# Patient Record
Sex: FEMALE | Race: WHITE | NOT HISPANIC OR LATINO | Employment: UNEMPLOYED | URBAN - METROPOLITAN AREA
[De-identification: names, ages, dates, MRNs, and addresses within clinical notes are randomized per-mention and may not be internally consistent; named-entity substitution may affect disease eponyms.]

---

## 2017-01-04 ENCOUNTER — ROUTINE PRENATAL (OUTPATIENT)
Dept: OBSTETRICS AND GYNECOLOGY | Facility: CLINIC | Age: 32
End: 2017-01-04

## 2017-01-04 ENCOUNTER — PROCEDURE VISIT (OUTPATIENT)
Dept: OBSTETRICS AND GYNECOLOGY | Facility: CLINIC | Age: 32
End: 2017-01-04

## 2017-01-04 VITALS — DIASTOLIC BLOOD PRESSURE: 84 MMHG | SYSTOLIC BLOOD PRESSURE: 110 MMHG | WEIGHT: 167 LBS | BODY MASS INDEX: 32.61 KG/M2

## 2017-01-04 DIAGNOSIS — Z34.83 PRENATAL CARE, SUBSEQUENT PREGNANCY, THIRD TRIMESTER: Primary | ICD-10-CM

## 2017-01-04 PROBLEM — Z30.2 REQUEST FOR STERILIZATION: Status: ACTIVE | Noted: 2017-01-04

## 2017-01-04 LAB
GLUCOSE UR STRIP-MCNC: NEGATIVE MG/DL
KETONES UR QL: NEGATIVE
PROT UR STRIP-MCNC: ABNORMAL MG/DL

## 2017-01-04 PROCEDURE — 76819 FETAL BIOPHYS PROFIL W/O NST: CPT | Performed by: OBSTETRICS & GYNECOLOGY

## 2017-01-04 PROCEDURE — 99213 OFFICE O/P EST LOW 20 MIN: CPT | Performed by: OBSTETRICS & GYNECOLOGY

## 2017-01-04 PROCEDURE — 81002 URINALYSIS NONAUTO W/O SCOPE: CPT | Performed by: OBSTETRICS & GYNECOLOGY

## 2017-01-04 NOTE — MR AVS SNAPSHOT
Yana Dunham   2017 10:00 AM   Procedure visit    Dept Phone:  [phone]   Encounter #:  [id number]    Provider:  ULTRASOUND LPFW [place]   Department:  [facility] OB GYN                Your Full Care Plan              Your Updated Medication List          This list is accurate as of: 17 11:29 AM.  Always use your most recent med list.                * prenatal (CLASSIC) vitamin 28-0.8 MG tablet tablet       * prenatal vitamins 27-1 MG tablet tablet       * Notice:  This list has 2 medication(s) that are the same as other medications prescribed for you. Read the directions carefully, and ask your doctor or other care provider to review them with you.            We Performed the Following     US Fetal Biophysical Profile Without Non Stress Testing       You Were Diagnosed With        Codes Comments    Small for gestational age (SGA)    -  Primary ICD-10-CM: P05.00  ICD-9-CM: 764.00       Instructions     None    Patient Instructions History      Upcoming Appointments     Visit Type Date Time Department    OB ULTRASOUND 2017 10:00 AM [facility] [place]    OB FOLLOWUP 2017 10:40 AM [facility] [place]    OB ULTRASOUND 2017  9:10 AM [facility] [place]    OB FOLLOWUP 2017  9:50 AM [facility] Signup     [facility] Adfaces allows you to send messages to your doctor, view your test results, renew your prescriptions, schedule appointments, and more. To sign up, go to XO Communications and click on the Sign Up Now link in the New User? box. Enter your Adfaces Activation Code exactly as it appears below along with the last four digits of your Social Security Number and your Date of Birth () to complete the sign-up process. If you do not sign up before the expiration date, you must request a new code.    Adfaces Activation Code: [email or web address]  Expires: 2017  3:07 PM    If you have questions, you can email  [email or web address] or call [phone] to talk to our MyChart staff. Remember, CounterStormhart is NOT to be used for urgent needs. For medical emergencies, dial 911.               Other Info from Your Visit           Your Appointments     Jan 11, 2017  9:10 AM EST   OB ULTRASOUND with ULTRASOUND LPFW TANIKA   [facility] OB GYN (--)    [address] [place] [address]   [phone]            Jan 11, 2017  9:50 AM EST   OB FOLLOWUP with Randell Pate MD   [facility] GYN (--)    [address] [id number] [phone]              Allergies     No Known Allergies      Vital Signs     Last Menstrual Period Smoking Status                04/28/2016 Light Tobacco Smoker          Problems and Diagnoses Noted     Small for gestational age    -  Primary

## 2017-01-04 NOTE — PROGRESS NOTES
5-1 9.4% MIYA 12 Gr 2 placenta BPP 8/8   Disc SGA.  Rec kick cts, wkly BPPs  GBS done  Desires tubal-tubal papers

## 2017-01-04 NOTE — MR AVS SNAPSHOT
Yana Dunham   2017 10:40 AM   Routine Prenatal    Dept Phone:  [phone]   Encounter #:  [id number]    Provider:  Randell Pate MD   Department:  [facility] OB GYN                Your Full Care Plan              Your Updated Medication List          This list is accurate as of: 17 11:39 AM.  Always use your most recent med list.                * prenatal (CLASSIC) vitamin 28-0.8 MG tablet tablet       * prenatal vitamins 27-1 MG tablet tablet       * Notice:  This list has 2 medication(s) that are the same as other medications prescribed for you. Read the directions carefully, and ask your doctor or other care provider to review them with you.            We Performed the Following     POC Urinalysis Dipstick     Strep Grp B JONH + Reflex       You Were Diagnosed With        Codes Comments    Prenatal care, subsequent pregnancy, third trimester    -  Primary ICD-10-CM: Z34.83  ICD-9-CM: V22.1       Instructions     None    Patient Instructions History      Upcoming Appointments     Visit Type Date Time Department    OB ULTRASOUND 2017 10:00 AM [facility] [place]    OB FOLLOWUP 2017 10:40 AM [facility] [place]    OB ULTRASOUND 2017  9:10 AM [facility] [place]    OB FOLLOWUP 2017  9:50 AM [facility] [place]      MyChart Signup     [facility] CradlePoint Technology allows you to send messages to your doctor, view your test results, renew your prescriptions, schedule appointments, and more. To sign up, go to Lawdingo and click on the Sign Up Now link in the New User? box. Enter your CradlePoint Technology Activation Code exactly as it appears below along with the last four digits of your Social Security Number and your Date of Birth () to complete the sign-up process. If you do not sign up before the expiration date, you must request a new code.    CradlePoint Technology Activation Code: [email or web address]  Expires: 2017  3:07 PM    If you have  questions, you can email [email or web address] or call [phone] to talk to our MyChart staff. Remember, PromoFarma.comhart is NOT to be used for urgent needs. For medical emergencies, dial 911.               Other Info from Your Visit           Your Appointments     Jan 11, 2017  9:10 AM EST   OB ULTRASOUND with ULTRASOUND LPFW [place]   [facility] OB GYN (--)    [address] [place] [address]   [phone]            Jan 11, 2017  9:50 AM EST   OB FOLLOWUP with Randell Pate MD   [facility] OB GYN (--)    [address] [address]   [phone]              Allergies     No Known Allergies      Reason for Visit     Routine Prenatal Visit           Vital Signs     Blood Pressure Weight Last Menstrual Period Body Mass Index Smoking Status       110/84 167 lb (75.8 kg) 04/28/2016 32.61 kg/m2 Light Tobacco Smoker       Problems and Diagnoses Noted     Request for sterilization    Small for gestational age    Prenatal care    -  Primary      Results     POC Urinalysis Dipstick      Component Value Standard Range & Units    Glucose, UA Negative Negative, 1000 mg/dL (3+) mg/dL    Ketones, UA Negative Negative    Protein, POC 2+ Negative mg/dL

## 2017-01-06 LAB — GP B STREP DNA SPEC QL NAA+PROBE: NEGATIVE

## 2017-01-11 ENCOUNTER — PROCEDURE VISIT (OUTPATIENT)
Dept: OBSTETRICS AND GYNECOLOGY | Facility: CLINIC | Age: 32
End: 2017-01-11

## 2017-01-11 ENCOUNTER — ROUTINE PRENATAL (OUTPATIENT)
Dept: OBSTETRICS AND GYNECOLOGY | Facility: CLINIC | Age: 32
End: 2017-01-11

## 2017-01-11 VITALS — BODY MASS INDEX: 33.4 KG/M2 | SYSTOLIC BLOOD PRESSURE: 120 MMHG | DIASTOLIC BLOOD PRESSURE: 80 MMHG | WEIGHT: 171 LBS

## 2017-01-11 DIAGNOSIS — Z34.83 PRENATAL CARE, SUBSEQUENT PREGNANCY, THIRD TRIMESTER: Primary | ICD-10-CM

## 2017-01-11 DIAGNOSIS — O36.5930 SMALL FOR DATES AFFECTING MANAGEMENT OF MOTHER, THIRD TRIMESTER, NOT APPLICABLE OR UNSPECIFIED FETUS: Primary | ICD-10-CM

## 2017-01-11 LAB
GLUCOSE UR STRIP-MCNC: NEGATIVE MG/DL
KETONES UR QL: NEGATIVE
PROT UR STRIP-MCNC: NEGATIVE MG/DL

## 2017-01-11 PROCEDURE — 81002 URINALYSIS NONAUTO W/O SCOPE: CPT | Performed by: OBSTETRICS & GYNECOLOGY

## 2017-01-11 PROCEDURE — 99213 OFFICE O/P EST LOW 20 MIN: CPT | Performed by: OBSTETRICS & GYNECOLOGY

## 2017-01-11 PROCEDURE — 76819 FETAL BIOPHYS PROFIL W/O NST: CPT | Performed by: OBSTETRICS & GYNECOLOGY

## 2017-01-11 PROCEDURE — 76816 OB US FOLLOW-UP PER FETUS: CPT | Performed by: OBSTETRICS & GYNECOLOGY

## 2017-01-11 NOTE — MR AVS SNAPSHOT
Yana Dunham   2017 9:50 AM   Routine Prenatal    Dept Phone:  [phone]   Encounter #:  [id number]    Provider:  Randell Pate MD   Department:  [facility]                Your Full Care Plan              Your Updated Medication List          This list is accurate as of: 17 10:01 AM.  Always use your most recent med list.                * prenatal (CLASSIC) vitamin 28-0.8 MG tablet tablet       * prenatal vitamins 27-1 MG tablet tablet       * Notice:  This list has 2 medication(s) that are the same as other medications prescribed for you. Read the directions carefully, and ask your doctor or other care provider to review them with you.            We Performed the Following     POC Urinalysis Dipstick       You Were Diagnosed With        Codes Comments    Prenatal care, subsequent pregnancy, third trimester    -  Primary ICD-10-CM: Z34.83  ICD-9-CM: V22.1       Instructions     None    Patient Instructions History      Upcoming Appointments     Visit Type Date Time Department    OB ULTRASOUND 2017  9:10 AM [facility] [place]    OB FOLLOWUP 2017  9:50 AM [facility] [place]    OB ULTRASOUND 2017  8:20 AM [facility] [place]    OB FOLLOWUP 2017  9:30 AM [facility] [place]      MyChart Signup     [facility] ReInnervate allows you to send messages to your doctor, view your test results, renew your prescriptions, schedule appointments, and more. To sign up, go to Traffline and click on the Sign Up Now link in the New User? box. Enter your ReInnervate Activation Code exactly as it appears below along with the last four digits of your Social Security Number and your Date of Birth () to complete the sign-up process. If you do not sign up before the expiration date, you must request a new code.    ReInnervate Activation Code: [email or web address]  Expires: 2017  3:07 PM    If you have questions, you can email  [email or web address] or call [phone] to talk to our MyChart staff. Remember, MyChart is NOT to be used for urgent needs. For medical emergencies, dial 911.               Other Info from Your Visit           Your Appointments     Jan 18, 2017  8:20 AM EST   OB ULTRASOUND with ULTRASOUND LPFW TANIKA   [facility] OB GYN (--)    [address] [place] [address]   [phone]            Jan 18, 2017  9:30 AM EST   OB FOLLOWUP with Randell Pate MD   [facility] G[facility] (--)    [address] [address]   [phone]              Allergies     No Known Allergies      Reason for Visit     Routine Prenatal Visit           Vital Signs     Blood Pressure Weight Last Menstrual Period Body Mass Index Smoking Status       120/80 171 lb (77.6 kg) 04/28/2016 33.4 kg/m2 Light Tobacco Smoker       Problems and Diagnoses Noted     Prenatal care    -  Primary      Results     POC Urinalysis Dipstick      Component Value Standard Range & Units    Glucose, UA Negative Negative, 1000 mg/dL (3+) mg/dL    Ketones, UA Negative Negative    Protein, POC Negative Negative mg/dL

## 2017-01-11 NOTE — MR AVS SNAPSHOT
Yana Dunham   2017 9:10 AM   Procedure visit    Dept Phone:  [phone]   Encounter #:  [id number]    Provider:  ULTRASOUND LPFW [place]   Department:  [facility] OB GYN                Your Full Care Plan              Your Updated Medication List          This list is accurate as of: 17  9:59 AM.  Always use your most recent med list.                * prenatal (CLASSIC) vitamin 28-0.8 MG tablet tablet       * prenatal vitamins 27-1 MG tablet tablet       * Notice:  This list has 2 medication(s) that are the same as other medications prescribed for you. Read the directions carefully, and ask your doctor or other care provider to review them with you.            We Performed the Following     US Fetal Biophysical Profile Without Non Stress Testing       You Were Diagnosed With        Codes Comments    Small for dates affecting management of mother, third trimester, not applicable or unspecified fetus    -  Primary ICD-10-CM: O36.5930  ICD-9-CM: 656.53       Instructions     None    Patient Instructions History      Upcoming Appointments     Visit Type Date Time Department    OB ULTRASOUND 2017  9:10 AM [facility] [place]    OB FOLLOWUP 2017  9:50 AM [facility] [place]    OB ULTRASOUND 2017  8:20 AM [facility] [place]    OB FOLLOWUP 2017  9:30 AM [facility] [place]      MyChart Signup     [facility]vanB[facility] HospitalOcar[facility] [employer] allows you to send messages to your doctor, view your test results, renew your prescriptions, schedule appointments, and more. To sign up, go to Grouply and click on the Sign Up Now link in the New User? box. Enter your [employer] Activation Code exactly as it appears below along with the last four digits of your Social Security Number and your Date of Birth () to complete the sign-up process. If you do not sign up before the expiration date, you must request a new code.    Ot[employer] Activation Code:  [email or web address]  Expires: 1/12/2017  3:07 PM    If you have questions, you can email [email or web address] or call [phone] to talk to our MyChart staff. Remember, MyChart is NOT to be used for urgent needs. For medical emergencies, dial 911.               Other Info from Your Visit           Your Appointments     Jan 18, 2017  8:20 AM EST   OB ULTRASOUND with ULTRASOUND LPFW [facility] (--)    [address] [place] [address]   [phone]            Jan 18, 2017  9:30 AM EST   OB FOLLOWUP with Randell Pate MD   [facility] GYN (--)    [address] [place], [place] [address] [phone]              Allergies     No Known Allergies      Vital Signs     Last Menstrual Period Smoking Status                04/28/2016 Light Tobacco Smoker          Problems and Diagnoses Noted     Intrauterine growth restriction affecting care of mother    -  Primary

## 2017-01-18 ENCOUNTER — PROCEDURE VISIT (OUTPATIENT)
Dept: OBSTETRICS AND GYNECOLOGY | Facility: CLINIC | Age: 32
End: 2017-01-18

## 2017-01-18 ENCOUNTER — ROUTINE PRENATAL (OUTPATIENT)
Dept: OBSTETRICS AND GYNECOLOGY | Facility: CLINIC | Age: 32
End: 2017-01-18

## 2017-01-18 VITALS — SYSTOLIC BLOOD PRESSURE: 118 MMHG | WEIGHT: 172 LBS | DIASTOLIC BLOOD PRESSURE: 82 MMHG | BODY MASS INDEX: 33.59 KG/M2

## 2017-01-18 DIAGNOSIS — Z34.83 PRENATAL CARE, SUBSEQUENT PREGNANCY, THIRD TRIMESTER: Primary | ICD-10-CM

## 2017-01-18 LAB
GLUCOSE UR STRIP-MCNC: NEGATIVE MG/DL
KETONES UR QL: NEGATIVE
PROT UR STRIP-MCNC: ABNORMAL MG/DL
RBC # UR STRIP: ABNORMAL /UL

## 2017-01-18 PROCEDURE — 99213 OFFICE O/P EST LOW 20 MIN: CPT | Performed by: OBSTETRICS & GYNECOLOGY

## 2017-01-18 PROCEDURE — 76819 FETAL BIOPHYS PROFIL W/O NST: CPT | Performed by: OBSTETRICS & GYNECOLOGY

## 2017-01-18 PROCEDURE — 81002 URINALYSIS NONAUTO W/O SCOPE: CPT | Performed by: OBSTETRICS & GYNECOLOGY

## 2017-01-18 NOTE — MR AVS SNAPSHOT
Yana Dunham   2017 8:20 AM   Procedure visit    Dept Phone:  [phone]   Encounter #:  [id number]    Provider:  ULTRASOUND LPFW [place]   Department:  [facility] OB GYN                Your Full Care Plan              Your Updated Medication List          This list is accurate as of: 17 10:01 AM.  Always use your most recent med list.                * prenatal (CLASSIC) vitamin 28-0.8 MG tablet tablet       * prenatal vitamins 27-1 MG tablet tablet       * Notice:  This list has 2 medication(s) that are the same as other medications prescribed for you. Read the directions carefully, and ask your doctor or other care provider to review them with you.            We Performed the Following     US Fetal Biophysical Profile Without Non Stress Testing       You Were Diagnosed With        Codes Comments    Small for gestational age (SGA)    -  Primary ICD-10-CM: P05.00  ICD-9-CM: 764.00       Instructions     None    Patient Instructions History      Upcoming Appointments     Visit Type Date Time Department    OB ULTRASOUND 2017  8:20 AM [facility] [place]    OB FOLLOWUP 2017  9:30 AM [facility] [place] [facility] Signup     [facility] Boxxet allows you to send messages to your doctor, view your test results, renew your prescriptions, schedule appointments, and more. To sign up, go to Pyreg and click on the Sign Up Now link in the New User? box. Enter your Boxxet Activation Code exactly as it appears below along with the last four digits of your Social Security Number and your Date of Birth () to complete the sign-up process. If you do not sign up before the expiration date, you must request a new code.    Boxxet Activation Code: [email or web address]  Expires: 2017 10:01 AM    If you have questions, you can email [email or web address] or call [phone] to talk to our Boxxet staff. Remember, Boxxet is NOT to be  used for urgent needs. For medical emergencies, dial 911.               Other Info from Your Visit           Allergies     No Known Allergies      Vital Signs     Last Menstrual Period Smoking Status                04/28/2016 Light Tobacco Smoker          Problems and Diagnoses Noted     Small for gestational age    -  Primary

## 2017-01-18 NOTE — MR AVS SNAPSHOT
Yana Dunham   2017 9:30 AM   Routine Prenatal    Dept Phone:  [phone]   Encounter #:  [id number]    Provider:  Randell Pate MD   Department:  [facility]                Your Full Care Plan              Your Updated Medication List          This list is accurate as of: 17 10:11 AM.  Always use your most recent med list.                * prenatal (CLASSIC) vitamin 28-0.8 MG tablet tablet       * prenatal vitamins 27-1 MG tablet tablet       * Notice:  This list has 2 medication(s) that are the same as other medications prescribed for you. Read the directions carefully, and ask your doctor or other care provider to review them with you.            We Performed the Following     POC Urinalysis Dipstick       You Were Diagnosed With        Codes Comments    Prenatal care, subsequent pregnancy, third trimester    -  Primary ICD-10-CM: Z34.83  ICD-9-CM: V22.1       Instructions     None    Patient Instructions History      Upcoming Appointments     Visit Type Date Time Department    OB ULTRASOUND 2017  8:20 AM [facility] [place]    OB FOLLOWUP 2017  9:30 AM [facility] [place]    OB ULTRASOUND 2017 10:00 AM [facility] [place]    OB FOLLOWUP 2017 10:20 AM [facility] [place]      MyChart Signup     [facility] FashionStake allows you to send messages to your doctor, view your test results, renew your prescriptions, schedule appointments, and more. To sign up, go to Akumina and click on the Sign Up Now link in the New User? box. Enter your FashionStake Activation Code exactly as it appears below along with the last four digits of your Social Security Number and your Date of Birth () to complete the sign-up process. If you do not sign up before the expiration date, you must request a new code.    FashionStake Activation Code: [email or web address]  Expires: 2017 10:01 AM    If you have questions, you can email  [email or web address] or call [phone] to talk to our MyChart staff. Remember, CopperKeyhart is NOT to be used for urgent needs. For medical emergencies, dial 911.               Other Info from Your Visit           Your Appointments     Jan 25, 2017 10:00 AM EST   OB ULTRASOUND with ULTRASOUND LPFW TANIKA   [facility] OB GYN (--)    [address] [place] [address]   [phone]            Jan 25, 2017 10:20 AM EST   OB FOLLOWUP with Servando Rodriguez MD   [facility] (--)    [address] [place] [address]   [phone]              Allergies     No Known Allergies      Reason for Visit     Routine Prenatal Visit           Vital Signs     Blood Pressure Weight Last Menstrual Period Body Mass Index Smoking Status       118/82 172 lb (78 kg) 04/28/2016 33.59 kg/m2 Light Tobacco Smoker       Problems and Diagnoses Noted     Prenatal care    -  Primary      Results     POC Urinalysis Dipstick      Component Value Standard Range & Units    Glucose, UA Negative Negative, 1000 mg/dL (3+) mg/dL    Ketones, UA Negative Negative    Blood, UA Trace Negative    Protein,  mg/dL Negative mg/dL

## 2017-01-18 NOTE — PROGRESS NOTES
BPP 8/8 MIYA 10.3  No sx PIH except swelling Disc poss IOL 1-31 if still undelivered  PIH warnings

## 2017-02-13 ENCOUNTER — HOSPITAL ENCOUNTER (OUTPATIENT)
Facility: HOSPITAL | Age: 32
Setting detail: HOSPITAL OUTPATIENT SURGERY
End: 2017-02-13
Attending: OBSTETRICS & GYNECOLOGY | Admitting: OBSTETRICS & GYNECOLOGY

## 2017-02-13 ENCOUNTER — POSTPARTUM VISIT (OUTPATIENT)
Dept: OBSTETRICS AND GYNECOLOGY | Facility: CLINIC | Age: 32
End: 2017-02-13

## 2017-02-13 VITALS
HEIGHT: 62 IN | WEIGHT: 147 LBS | DIASTOLIC BLOOD PRESSURE: 74 MMHG | SYSTOLIC BLOOD PRESSURE: 100 MMHG | BODY MASS INDEX: 27.05 KG/M2

## 2017-02-13 DIAGNOSIS — Z30.2 REQUEST FOR STERILIZATION: ICD-10-CM

## 2017-02-13 PROCEDURE — 99213 OFFICE O/P EST LOW 20 MIN: CPT | Performed by: OBSTETRICS & GYNECOLOGY

## 2017-02-13 NOTE — PROGRESS NOTES
Subjective   Yana Dunham is a 31 y.o. female here for postpartum check.     History of Present Illness   Patient presents for follow-up 3 weeks after a spontaneous vaginal delivery of a viable infant 5 lbs. 15 oz. at [facility].  She desires permanent sterilization.  She is breast-feeding and this is going well.    The following portions of the patient's history were reviewed and updated as appropriate: allergies, current medications, past family history, past medical history, past social history, past surgical history and problem list.    Review of Systems   Gastrointestinal: Negative for abdominal distention and abdominal pain.   Genitourinary: Negative for difficulty urinating, pelvic pain and vaginal bleeding.       Objective   Physical Exam   Constitutional: She is oriented to person, place, and time. She appears well-developed and well-nourished.   HENT:   Head: Normocephalic.   Neck: Neck supple. No thyromegaly present.   Cardiovascular: Normal rate, regular rhythm and normal heart sounds.  Exam reveals no gallop.    No murmur heard.  Pulmonary/Chest: Effort normal and breath sounds normal.   Abdominal: Soft. Bowel sounds are normal. She exhibits no distension. There is no tenderness.   Musculoskeletal: Normal range of motion. She exhibits no edema or tenderness.   Neurological: She is alert and oriented to person, place, and time. She displays normal reflexes.   Skin: Skin is warm and dry.   Psychiatric: She has a normal mood and affect. Her behavior is normal.       Assessment/Plan   Yana was seen today for postpartum follow-up.    Diagnoses and all orders for this visit:    Postpartum state    Request for sterilization  -     Case Request   we discussed laparoscopic tubal coagulation in detail.  The risks, complications, and failure rates were discussed specifically.  Convalescence was likewise discussed.  She has already signed a tubal consent form here in the office.  We will schedule the  procedure at the patient's convenience.

## 2020-01-13 ENCOUNTER — PROCEDURE VISIT (OUTPATIENT)
Dept: OBSTETRICS AND GYNECOLOGY | Facility: CLINIC | Age: 35
End: 2020-01-13

## 2020-01-13 ENCOUNTER — INITIAL PRENATAL (OUTPATIENT)
Dept: OBSTETRICS AND GYNECOLOGY | Facility: CLINIC | Age: 35
End: 2020-01-13

## 2020-01-13 VITALS — DIASTOLIC BLOOD PRESSURE: 73 MMHG | SYSTOLIC BLOOD PRESSURE: 117 MMHG | WEIGHT: 168.8 LBS | BODY MASS INDEX: 30.87 KG/M2

## 2020-01-13 DIAGNOSIS — O09.522 MULTIGRAVIDA OF ADVANCED MATERNAL AGE IN SECOND TRIMESTER: ICD-10-CM

## 2020-01-13 DIAGNOSIS — O36.80X0 ENCOUNTER TO DETERMINE FETAL VIABILITY OF PREGNANCY, SINGLE OR UNSPECIFIED FETUS: Primary | ICD-10-CM

## 2020-01-13 DIAGNOSIS — Z34.82 PRENATAL CARE, SUBSEQUENT PREGNANCY IN SECOND TRIMESTER: Primary | ICD-10-CM

## 2020-01-13 LAB
GLUCOSE UR STRIP-MCNC: NEGATIVE MG/DL
PROT UR STRIP-MCNC: NEGATIVE MG/DL

## 2020-01-13 PROCEDURE — 76805 OB US >/= 14 WKS SNGL FETUS: CPT | Performed by: OBSTETRICS & GYNECOLOGY

## 2020-01-13 PROCEDURE — 99214 OFFICE O/P EST MOD 30 MIN: CPT | Performed by: OBSTETRICS & GYNECOLOGY

## 2020-01-13 NOTE — PROGRESS NOTES
IOB   34-year-old white female  7 para 4 AB 2 last menstrual period  presents for initial OB evaluation.  Her menstrual cycles were regular and she was not using any form of contraception.  She has no chronic medical problems and has started prenatal vitamins.  There is no family history of genetic disorders.  She has had 4 uncomplicated vaginal deliveries in the past.  Her last baby weighed 8 pounds 15 ounces.  She denies any vaginal bleeding since her last menstrual period.  ROS: + Nausea, lower extremity swelling, and reflux.  All other systems were reviewed and were negative.  S=D=U/S  A: Viable IUP at 17-1/2 weeks gestation  Advanced maternal age  Late presentation  P: We will obtain basic prenatal laboratory studies.  The patient desires genetic testing.  We discussed pregnancy in detail including diet, exercise, medications, immunizations, travel, and optional testing.

## 2020-01-14 LAB
ABO GROUP BLD: ABNORMAL
BASOPHILS # BLD AUTO: 0 X10E3/UL (ref 0–0.2)
BASOPHILS NFR BLD AUTO: 0 %
BLD GP AB SCN SERPL QL: NEGATIVE
EOSINOPHIL # BLD AUTO: 0.3 X10E3/UL (ref 0–0.4)
EOSINOPHIL NFR BLD AUTO: 3 %
ERYTHROCYTE [DISTWIDTH] IN BLOOD BY AUTOMATED COUNT: 14 % (ref 11.7–15.4)
HBV SURFACE AG SERPL QL IA: NEGATIVE
HCT VFR BLD AUTO: 31.9 % (ref 34–46.6)
HCV AB S/CO SERPL IA: <0.1 S/CO RATIO (ref 0–0.9)
HGB BLD-MCNC: 11 G/DL (ref 11.1–15.9)
HIV 1+2 AB+HIV1 P24 AG SERPL QL IA: NON REACTIVE
IMM GRANULOCYTES # BLD AUTO: 0.1 X10E3/UL (ref 0–0.1)
IMM GRANULOCYTES NFR BLD AUTO: 1 %
LYMPHOCYTES # BLD AUTO: 2.6 X10E3/UL (ref 0.7–3.1)
LYMPHOCYTES NFR BLD AUTO: 30 %
MCH RBC QN AUTO: 30.4 PG (ref 26.6–33)
MCHC RBC AUTO-ENTMCNC: 34.5 G/DL (ref 31.5–35.7)
MCV RBC AUTO: 88 FL (ref 79–97)
MONOCYTES # BLD AUTO: 0.6 X10E3/UL (ref 0.1–0.9)
MONOCYTES NFR BLD AUTO: 7 %
NEUTROPHILS # BLD AUTO: 5.3 X10E3/UL (ref 1.4–7)
NEUTROPHILS NFR BLD AUTO: 59 %
PLATELET # BLD AUTO: 368 X10E3/UL (ref 150–450)
RBC # BLD AUTO: 3.62 X10E6/UL (ref 3.77–5.28)
RH BLD: POSITIVE
RPR SER QL: NON REACTIVE
RUBV IGG SERPL IA-ACNC: <0.9 INDEX
TSH SERPL DL<=0.005 MIU/L-ACNC: 2.1 UIU/ML (ref 0.45–4.5)
WBC # BLD AUTO: 8.9 X10E3/UL (ref 3.4–10.8)

## 2020-01-15 PROBLEM — O09.899 RUBELLA NON-IMMUNE STATUS, ANTEPARTUM: Status: ACTIVE | Noted: 2020-01-15

## 2020-01-15 PROBLEM — Z28.39 RUBELLA NON-IMMUNE STATUS, ANTEPARTUM: Status: ACTIVE | Noted: 2020-01-15

## 2020-01-15 LAB
BACTERIA UR CULT: NORMAL
BACTERIA UR CULT: NORMAL
C TRACH RRNA CVX QL NAA+PROBE: NEGATIVE
CONV .: NORMAL
CYTOLOGIST CVX/VAG CYTO: NORMAL
CYTOLOGY CVX/VAG DOC CYTO: NORMAL
CYTOLOGY CVX/VAG DOC THIN PREP: NORMAL
DX ICD CODE: NORMAL
HIV 1 & 2 AB SER-IMP: NORMAL
N GONORRHOEA RRNA CVX QL NAA+PROBE: NEGATIVE
OTHER STN SPEC: NORMAL
STAT OF ADQ CVX/VAG CYTO-IMP: NORMAL

## 2020-01-18 LAB
CFDNA.FET/CFDNA.TOTAL SFR FETUS: NORMAL %
CFDNA.FET/CFDNA.TOTAL SFR FETUS: NORMAL %
CITATION REF LAB TEST: NORMAL
FET CHR 13 TS PLAS.CFDNA QL: NEGATIVE
FET CHR 13+18+21 TS PLAS.CFDNA QL: NORMAL
FET CHR 18 TS PLAS.CFDNA QL: NEGATIVE
FET CHR 21 TS PLAS.CFDNA QL: NEGATIVE
FET Y CHROM PLAS.CFDNA QL: DETECTED
FET Y CHROM PLAS.CFDNA: NORMAL
GA EST FROM CONCEPTION DATE: NORMAL D
GESTATIONAL AGE > 9:: YES
LAB DIRECTOR NAME PROVIDER: NORMAL
LABORATORY COMMENT REPORT: NORMAL
LIMITATIONS OF THE TEST: NORMAL
NOTE: NORMAL
POSITIVE PREDICTIVE VALUE: NORMAL
REF LAB TEST METHOD: NORMAL
SERVICE CMNT 02-IMP: NORMAL
SERVICE CMNT 03-IMP: NORMAL
SERVICE CMNT-IMP: NORMAL
TEST PERFORMANCE INFO SPEC: NORMAL
TEST PERFORMANCE INFO SPEC: NORMAL

## 2020-01-27 ENCOUNTER — TELEPHONE (OUTPATIENT)
Dept: OBSTETRICS AND GYNECOLOGY | Facility: CLINIC | Age: 35
End: 2020-01-27

## 2020-01-27 NOTE — TELEPHONE ENCOUNTER
----- Message from Felicity Tavarez MD sent at 1/20/2020 10:31 PM EST -----  Please let patient know that aneuploidy screening was negative for trisomy 21/18/13 and if she wishes to know gender, it's a boy.    Patient has been informed of negative trisomy 21/18/13 and is aware of gender being a boy

## 2020-02-10 ENCOUNTER — PROCEDURE VISIT (OUTPATIENT)
Dept: OBSTETRICS AND GYNECOLOGY | Facility: CLINIC | Age: 35
End: 2020-02-10

## 2020-02-10 DIAGNOSIS — O35.BXX0 ECHOGENIC FOCUS OF HEART OF FETUS AFFECTING ANTEPARTUM CARE OF MOTHER, SINGLE OR UNSPECIFIED FETUS: ICD-10-CM

## 2020-02-10 DIAGNOSIS — Z36.89 ENCOUNTER FOR FETAL ANATOMIC SURVEY: ICD-10-CM

## 2020-02-10 DIAGNOSIS — O09.522 MULTIGRAVIDA OF ADVANCED MATERNAL AGE IN SECOND TRIMESTER: Primary | ICD-10-CM

## 2020-02-10 PROCEDURE — 76805 OB US >/= 14 WKS SNGL FETUS: CPT | Performed by: OBSTETRICS & GYNECOLOGY

## 2020-02-19 ENCOUNTER — TELEPHONE (OUTPATIENT)
Dept: OBSTETRICS AND GYNECOLOGY | Facility: CLINIC | Age: 35
End: 2020-02-19

## 2020-02-24 ENCOUNTER — TELEPHONE (OUTPATIENT)
Dept: OBSTETRICS AND GYNECOLOGY | Facility: CLINIC | Age: 35
End: 2020-02-24

## 2020-03-27 ENCOUNTER — TELEPHONE (OUTPATIENT)
Dept: OBSTETRICS AND GYNECOLOGY | Facility: CLINIC | Age: 35
End: 2020-03-27

## 2020-04-15 ENCOUNTER — TELEPHONE (OUTPATIENT)
Dept: OBSTETRICS AND GYNECOLOGY | Facility: CLINIC | Age: 35
End: 2020-04-15

## 2020-04-15 NOTE — TELEPHONE ENCOUNTER
Good Afternoon,    Patient called to schedule her appointment and wanted to know if you wanted her to do her glucose test at this visit or not. Please advise.      # - [phone]  Thank you

## 2020-04-20 ENCOUNTER — HOSPITAL ENCOUNTER (EMERGENCY)
Facility: HOSPITAL | Age: 35
Discharge: HOME | End: 2020-04-20
Attending: OBSTETRICS & GYNECOLOGY | Admitting: OBSTETRICS & GYNECOLOGY

## 2020-04-20 VITALS
HEART RATE: 71 BPM | RESPIRATION RATE: 18 BRPM | OXYGEN SATURATION: 97 % | BODY MASS INDEX: 32.07 KG/M2 | TEMPERATURE: 98.1 F | HEIGHT: 63 IN | WEIGHT: 181 LBS | SYSTOLIC BLOOD PRESSURE: 116 MMHG | DIASTOLIC BLOOD PRESSURE: 75 MMHG

## 2020-04-20 LAB
AMPHET+METHAMPHET UR QL: POSITIVE
BARBITURATES UR QL SCN: NEGATIVE
BENZODIAZ UR QL SCN: NEGATIVE
BILIRUB UR QL STRIP: NEGATIVE
CANNABINOIDS SERPL QL: NEGATIVE
CLARITY UR: CLEAR
COCAINE UR QL: NEGATIVE
COLOR UR: YELLOW
EXPIRATION DATE: NORMAL
GLUCOSE UR STRIP-MCNC: NEGATIVE MG/DL
HGB UR QL STRIP.AUTO: NEGATIVE
KETONES UR QL STRIP: NEGATIVE
LEUKOCYTE ESTERASE UR QL STRIP.AUTO: NEGATIVE
Lab: NORMAL
METHADONE UR QL SCN: NEGATIVE
NITRITE UR QL STRIP: NEGATIVE
OPIATES UR QL: NEGATIVE
OXYCODONE UR QL SCN: NEGATIVE
PH UR STRIP.AUTO: <=5 [PH] (ref 5–8)
PROT UR QL STRIP: NEGATIVE
PROT UR STRIP-MCNC: NEGATIVE MG/DL
SP GR UR STRIP: 1.02 (ref 1–1.03)
UROBILINOGEN UR QL STRIP: NORMAL

## 2020-04-20 PROCEDURE — 59025 FETAL NON-STRESS TEST: CPT

## 2020-04-20 PROCEDURE — 80307 DRUG TEST PRSMV CHEM ANLYZR: CPT | Performed by: OBSTETRICS & GYNECOLOGY

## 2020-04-20 PROCEDURE — 81002 URINALYSIS NONAUTO W/O SCOPE: CPT | Performed by: OBSTETRICS & GYNECOLOGY

## 2020-04-20 PROCEDURE — 81003 URINALYSIS AUTO W/O SCOPE: CPT | Performed by: OBSTETRICS & GYNECOLOGY

## 2020-04-20 PROCEDURE — 99283 EMERGENCY DEPT VISIT LOW MDM: CPT

## 2020-04-20 RX ORDER — PANTOPRAZOLE SODIUM 40 MG/1
80 TABLET, DELAYED RELEASE ORAL ONCE
Status: COMPLETED | OUTPATIENT
Start: 2020-04-20 | End: 2020-04-20

## 2020-04-20 RX ORDER — CALCIUM CARBONATE 200(500)MG
2 TABLET,CHEWABLE ORAL DAILY
COMMUNITY
End: 2020-06-05 | Stop reason: HOSPADM

## 2020-04-20 RX ADMIN — PANTOPRAZOLE SODIUM 80 MG: 40 TABLET, DELAYED RELEASE ORAL at 22:28

## 2020-04-21 NOTE — ED PROVIDER NOTES
"[facility]  Yana Dunham  : 1985  MRN: [id number]  CSN: [id number]    OB ED Provider Note    Subjective   Chief Complaint   Patient presents with   • Abdominal Pain     Heartburn worse x3 days, vomiting \"pure acid\", tums is not helping     Yana Dunham is a 34 y.o. year old  with an Estimated Date of Delivery: 20 currently at 31w3d presenting with 4 day history of worsening heartburn and reflux, awakening her from sleep with reflux to her larynx.  She has had minimal relief from Tums.  She denies CTX, ROM or VB.  FM is present.  She is also complaining of pelvic pain, worse with ambulation, with bilateral LBP.    Prenatal care has been with Dr. Pate.  It has been complicated by elderly multigravida, non-compliance..    OB History    Para Term  AB Living   7 5 4 1 1 5   SAB TAB Ectopic Molar Multiple Live Births   1 0 0 0 0 0      # Outcome Date GA Lbr Vin/2nd Weight Sex Delivery Anes PTL Lv   7 Current            6 SAB            5 Term            4 Term            3 Term            2 Term            1               Past Medical History:   Diagnosis Date   • Abnormal Pap smear of cervix      Past Surgical History:   Procedure Laterality Date   • ADENOIDECTOMY     • CERVICAL BIOPSY  W/ LOOP ELECTRODE EXCISION     • CRYOTHERAPY     • TONSILLECTOMY         Current Facility-Administered Medications:   •  pantoprazole (PROTONIX) EC tablet 80 mg, 80 mg, Oral, Once, Chirag Santana MD    No Known Allergies  Social History    Tobacco Use      Smoking status: Light Tobacco Smoker        Packs/day: 0.25        Types: Cigarettes      Smokeless tobacco: Never Used    Review of Systems   Constitutional: Negative.    HENT: Positive for sore throat.    Eyes: Negative.    Respiratory: Negative.    Cardiovascular: Negative.    Gastrointestinal: Positive for abdominal pain and nausea.   Endocrine: Negative.    Genitourinary: Negative.    Musculoskeletal: Positive for back pain and " "gait problem.   Skin: Negative.          Objective   Pulse (!) 122   Temp 98.1 °F (36.7 °C) (Oral)   Resp 18   Ht 160 cm (63\")   Wt 82.1 kg (181 lb)   LMP 09/13/2019 (Exact Date)   BMI 32.06 kg/m²   General: well developed; well nourished  no acute distress   Abdomen: gravid, no epigastric tenderness, 2/4 symphyseal tenderness   FHT's: reassuring and category 1      Cervix: was not checked.   Presentation: cephalic   Contractions: none   Chest: Unlabored respirations   CV:  RRR   Ext:   No C/C/E   Back: CVA tenderness is absent, bilateral SI joint pain bilateral        Prenatal Labs  Lab Results   Component Value Date    HGB 11.0 (L) 01/13/2020    RUBELLAABIGG <0.90 (L) 01/13/2020    HEPBSAG Negative 01/13/2020    ABSCRN Negative 01/13/2020    EWZ7RUV3 Non Reactive 01/13/2020    HEPCVIRUSABY <0.1 01/13/2020     11/07/2016    STREPGPB Negative 01/04/2017    URINECX Final report 01/13/2020       Current Labs Reviewed   UA:    Lab Results   Component Value Date    SPECGRAVUR 1.018 04/20/2020    KETONESU Negative 04/20/2020    BLOODU Negative 04/20/2020    LEUKOCYTESUR Negative 04/20/2020    NITRITEU Negative 04/20/2020     UDS + for amphetamine     Assessment   1. IUP at 31w3d  2. GERD  3. Pelvic pain     Plan   1. Continue Tums.  Add OTC prilosec.  Encouraged elevation of head when sleeping.  Non-compliance with appointments addressed.  She was strongly encouraged to resume scheduled prenatal care.  Return for worsening symptoms, PTL.    Chirag Santana MD  4/20/2020  22:18     "

## 2020-05-04 ENCOUNTER — ROUTINE PRENATAL (OUTPATIENT)
Dept: OBSTETRICS AND GYNECOLOGY | Facility: CLINIC | Age: 35
End: 2020-05-04

## 2020-05-04 VITALS — SYSTOLIC BLOOD PRESSURE: 108 MMHG | WEIGHT: 186 LBS | BODY MASS INDEX: 32.95 KG/M2 | DIASTOLIC BLOOD PRESSURE: 71 MMHG

## 2020-05-04 DIAGNOSIS — O99.613 GASTROESOPHAGEAL REFLUX DURING PREGNANCY IN THIRD TRIMESTER, ANTEPARTUM: ICD-10-CM

## 2020-05-04 DIAGNOSIS — O09.899 RUBELLA NON-IMMUNE STATUS, ANTEPARTUM: ICD-10-CM

## 2020-05-04 DIAGNOSIS — Z34.83 PRENATAL CARE, SUBSEQUENT PREGNANCY, THIRD TRIMESTER: ICD-10-CM

## 2020-05-04 DIAGNOSIS — K21.9 GASTROESOPHAGEAL REFLUX DURING PREGNANCY IN THIRD TRIMESTER, ANTEPARTUM: ICD-10-CM

## 2020-05-04 DIAGNOSIS — Z28.39 RUBELLA NON-IMMUNE STATUS, ANTEPARTUM: ICD-10-CM

## 2020-05-04 DIAGNOSIS — Z30.2 REQUEST FOR STERILIZATION: Primary | ICD-10-CM

## 2020-05-04 DIAGNOSIS — O09.33 INSUFFICIENT PRENATAL CARE IN THIRD TRIMESTER: ICD-10-CM

## 2020-05-04 LAB
GLUCOSE UR STRIP-MCNC: NEGATIVE MG/DL
PROT UR STRIP-MCNC: NEGATIVE MG/DL

## 2020-05-04 PROCEDURE — 99214 OFFICE O/P EST MOD 30 MIN: CPT | Performed by: OBSTETRICS & GYNECOLOGY

## 2020-05-04 RX ORDER — FAMOTIDINE 20 MG/1
20 TABLET, FILM COATED ORAL 2 TIMES DAILY
Qty: 60 TABLET | Refills: 1 | Status: SHIPPED | OUTPATIENT
Start: 2020-05-04 | End: 2020-06-05 | Stop reason: HOSPADM

## 2020-05-04 NOTE — PROGRESS NOTES
CC: 33w3d IUP  Fetus active  No ctx  ROS: + reflux, breast fullness without leakage  A: Reflux  Non-compliance  P: Rx Pepcid  Needs GTS  U/S 2 weeks  Disc compliance

## 2020-05-05 ENCOUNTER — TELEPHONE (OUTPATIENT)
Dept: OBSTETRICS AND GYNECOLOGY | Facility: CLINIC | Age: 35
End: 2020-05-05

## 2020-05-06 ENCOUNTER — TELEPHONE (OUTPATIENT)
Dept: OBSTETRICS AND GYNECOLOGY | Facility: CLINIC | Age: 35
End: 2020-05-06

## 2020-05-06 NOTE — TELEPHONE ENCOUNTER
Patient wants to know what she is able to take for sleep?  States has been unable to sleep the past two days.

## 2020-05-07 LAB
BASOPHILS # BLD AUTO: 0.05 10*3/MM3 (ref 0–0.2)
BASOPHILS NFR BLD AUTO: 0.4 % (ref 0–1.5)
BLD GP AB SCN SERPL QL: NEGATIVE
EOSINOPHIL # BLD AUTO: 0.18 10*3/MM3 (ref 0–0.4)
EOSINOPHIL NFR BLD AUTO: 1.4 % (ref 0.3–6.2)
ERYTHROCYTE [DISTWIDTH] IN BLOOD BY AUTOMATED COUNT: 13.5 % (ref 12.3–15.4)
GLUCOSE 1H P 50 G GLC PO SERPL-MCNC: 202 MG/DL (ref 65–179)
HCT VFR BLD AUTO: 31.7 % (ref 34–46.6)
HGB BLD-MCNC: 10.3 G/DL (ref 12–15.9)
IMM GRANULOCYTES # BLD AUTO: 0.31 10*3/MM3 (ref 0–0.05)
IMM GRANULOCYTES NFR BLD AUTO: 2.5 % (ref 0–0.5)
LYMPHOCYTES # BLD AUTO: 2.22 10*3/MM3 (ref 0.7–3.1)
LYMPHOCYTES NFR BLD AUTO: 17.8 % (ref 19.6–45.3)
MCH RBC QN AUTO: 26.8 PG (ref 26.6–33)
MCHC RBC AUTO-ENTMCNC: 32.5 G/DL (ref 31.5–35.7)
MCV RBC AUTO: 82.3 FL (ref 79–97)
MONOCYTES # BLD AUTO: 0.56 10*3/MM3 (ref 0.1–0.9)
MONOCYTES NFR BLD AUTO: 4.5 % (ref 5–12)
NEUTROPHILS # BLD AUTO: 9.18 10*3/MM3 (ref 1.7–7)
NEUTROPHILS NFR BLD AUTO: 73.4 % (ref 42.7–76)
NRBC BLD AUTO-RTO: 0.1 /100 WBC (ref 0–0.2)
PLATELET # BLD AUTO: 343 10*3/MM3 (ref 140–450)
RBC # BLD AUTO: 3.85 10*6/MM3 (ref 3.77–5.28)
WBC # BLD AUTO: 12.5 10*3/MM3 (ref 3.4–10.8)

## 2020-05-08 RX ORDER — FERROUS SULFATE 325(65) MG
325 TABLET ORAL 2 TIMES DAILY WITH MEALS
Qty: 60 TABLET | Refills: 2 | Status: SHIPPED | OUTPATIENT
Start: 2020-05-08 | End: 2020-06-05 | Stop reason: HOSPADM

## 2020-05-09 ENCOUNTER — RESULTS ENCOUNTER (OUTPATIENT)
Dept: OBSTETRICS AND GYNECOLOGY | Facility: CLINIC | Age: 35
End: 2020-05-09

## 2020-05-09 DIAGNOSIS — Z34.83 PRENATAL CARE, SUBSEQUENT PREGNANCY, THIRD TRIMESTER: ICD-10-CM

## 2020-05-12 ENCOUNTER — LAB (OUTPATIENT)
Dept: OBSTETRICS AND GYNECOLOGY | Facility: CLINIC | Age: 35
End: 2020-05-12

## 2020-05-12 ENCOUNTER — PROCEDURE VISIT (OUTPATIENT)
Dept: OBSTETRICS AND GYNECOLOGY | Facility: CLINIC | Age: 35
End: 2020-05-12

## 2020-05-12 DIAGNOSIS — Z34.92 SECOND TRIMESTER PREGNANCY: Primary | ICD-10-CM

## 2020-05-12 DIAGNOSIS — O26.849 FETAL SIZE INCONSISTENT WITH DATES: Primary | ICD-10-CM

## 2020-05-12 DIAGNOSIS — Z3A.34 34 WEEKS GESTATION OF PREGNANCY: Primary | ICD-10-CM

## 2020-05-12 PROCEDURE — 76816 OB US FOLLOW-UP PER FETUS: CPT | Performed by: OBSTETRICS & GYNECOLOGY

## 2020-05-13 ENCOUNTER — TELEPHONE (OUTPATIENT)
Dept: OBSTETRICS AND GYNECOLOGY | Facility: CLINIC | Age: 35
End: 2020-05-13

## 2020-05-13 LAB
GLUCOSE 1H P 100 G GLC PO SERPL-MCNC: 239 MG/DL (ref 65–179)
GLUCOSE 2H P 100 G GLC PO SERPL-MCNC: 156 MG/DL (ref 65–154)
GLUCOSE 3H P 100 G GLC PO SERPL-MCNC: 66 MG/DL (ref 65–139)
GLUCOSE P FAST SERPL-MCNC: 98 MG/DL (ref 65–94)

## 2020-05-17 ENCOUNTER — RESULTS ENCOUNTER (OUTPATIENT)
Dept: OBSTETRICS AND GYNECOLOGY | Facility: CLINIC | Age: 35
End: 2020-05-17

## 2020-05-17 DIAGNOSIS — Z3A.34 34 WEEKS GESTATION OF PREGNANCY: ICD-10-CM

## 2020-05-27 ENCOUNTER — ROUTINE PRENATAL (OUTPATIENT)
Dept: OBSTETRICS AND GYNECOLOGY | Facility: CLINIC | Age: 35
End: 2020-05-27

## 2020-05-27 VITALS — BODY MASS INDEX: 34.19 KG/M2 | DIASTOLIC BLOOD PRESSURE: 72 MMHG | SYSTOLIC BLOOD PRESSURE: 108 MMHG | WEIGHT: 193 LBS

## 2020-05-27 DIAGNOSIS — Z28.39 RUBELLA NON-IMMUNE STATUS, ANTEPARTUM: ICD-10-CM

## 2020-05-27 DIAGNOSIS — O09.33 INSUFFICIENT PRENATAL CARE IN THIRD TRIMESTER: ICD-10-CM

## 2020-05-27 DIAGNOSIS — O36.5939 SGA (SMALL FOR GESTATIONAL AGE), FETAL, AFFECTING CARE OF MOTHER, ANTEPARTUM, THIRD TRIMESTER, OTHER FETUS: ICD-10-CM

## 2020-05-27 DIAGNOSIS — Z34.83 PRENATAL CARE, SUBSEQUENT PREGNANCY, THIRD TRIMESTER: Primary | ICD-10-CM

## 2020-05-27 DIAGNOSIS — O09.899 RUBELLA NON-IMMUNE STATUS, ANTEPARTUM: ICD-10-CM

## 2020-05-27 LAB
GLUCOSE UR STRIP-MCNC: NEGATIVE MG/DL
PROT UR STRIP-MCNC: NEGATIVE MG/DL

## 2020-05-27 PROCEDURE — 99213 OFFICE O/P EST LOW 20 MIN: CPT | Performed by: OBSTETRICS & GYNECOLOGY

## 2020-05-27 NOTE — PROGRESS NOTES
CC: 36w5d IUP  Fetus active  B-H ctx  EFW 4-15 28% 2 weeks ago  AC 6% MIYA 15  ROS: + LE swelling  AL borderline SGA  Borderline GDM  P: Recheck growth next week  Watch carbs  GBS done

## 2020-05-29 ENCOUNTER — TELEPHONE (OUTPATIENT)
Dept: OBSTETRICS AND GYNECOLOGY | Facility: CLINIC | Age: 35
End: 2020-05-29

## 2020-05-29 LAB — GP B STREP DNA SPEC QL NAA+PROBE: NEGATIVE

## 2020-05-31 ENCOUNTER — HOSPITAL ENCOUNTER (EMERGENCY)
Facility: HOSPITAL | Age: 35
Discharge: HOME | End: 2020-05-31
Attending: OBSTETRICS & GYNECOLOGY | Admitting: OBSTETRICS & GYNECOLOGY

## 2020-05-31 ENCOUNTER — HOSPITAL ENCOUNTER (OUTPATIENT)
Dept: LABOR AND DELIVERY | Facility: HOSPITAL | Age: 35
Discharge: HOME | End: 2020-05-31
Attending: OBSTETRICS & GYNECOLOGY

## 2020-05-31 VITALS
WEIGHT: 190 LBS | SYSTOLIC BLOOD PRESSURE: 117 MMHG | DIASTOLIC BLOOD PRESSURE: 69 MMHG | BODY MASS INDEX: 37.3 KG/M2 | HEIGHT: 60 IN | RESPIRATION RATE: 18 BRPM | HEART RATE: 96 BPM | TEMPERATURE: 98.4 F

## 2020-05-31 PROCEDURE — 59025 FETAL NON-STRESS TEST: CPT

## 2020-05-31 PROCEDURE — 99284 EMERGENCY DEPT VISIT MOD MDM: CPT

## 2020-05-31 PROCEDURE — 81001 URINALYSIS AUTO W/SCOPE: CPT | Performed by: OBSTETRICS & GYNECOLOGY

## 2020-06-01 ENCOUNTER — ROUTINE PRENATAL (OUTPATIENT)
Dept: OBSTETRICS AND GYNECOLOGY | Facility: CLINIC | Age: 35
End: 2020-06-01

## 2020-06-01 ENCOUNTER — PROCEDURE VISIT (OUTPATIENT)
Dept: OBSTETRICS AND GYNECOLOGY | Facility: CLINIC | Age: 35
End: 2020-06-01

## 2020-06-01 VITALS — WEIGHT: 191 LBS | BODY MASS INDEX: 37.3 KG/M2 | DIASTOLIC BLOOD PRESSURE: 69 MMHG | SYSTOLIC BLOOD PRESSURE: 101 MMHG

## 2020-06-01 DIAGNOSIS — O09.33 INSUFFICIENT PRENATAL CARE IN THIRD TRIMESTER: ICD-10-CM

## 2020-06-01 DIAGNOSIS — O36.5939 SGA (SMALL FOR GESTATIONAL AGE), FETAL, AFFECTING CARE OF MOTHER, ANTEPARTUM, THIRD TRIMESTER, OTHER FETUS: ICD-10-CM

## 2020-06-01 DIAGNOSIS — E66.01 MORBIDLY OBESE (HCC): ICD-10-CM

## 2020-06-01 DIAGNOSIS — Z34.83 PRENATAL CARE, SUBSEQUENT PREGNANCY, THIRD TRIMESTER: Primary | ICD-10-CM

## 2020-06-01 DIAGNOSIS — O99.333 MATERNAL TOBACCO USE IN THIRD TRIMESTER: ICD-10-CM

## 2020-06-01 DIAGNOSIS — O36.5939 SGA (SMALL FOR GESTATIONAL AGE), FETAL, AFFECTING CARE OF MOTHER, ANTEPARTUM, THIRD TRIMESTER, OTHER FETUS: Primary | ICD-10-CM

## 2020-06-01 LAB
BACTERIA UR QL AUTO: ABNORMAL /HPF
BILIRUB UR QL STRIP: NEGATIVE
CLARITY UR: ABNORMAL
COLOR UR: ABNORMAL
GLUCOSE UR STRIP-MCNC: NEGATIVE MG/DL
GLUCOSE UR STRIP-MCNC: NEGATIVE MG/DL
HGB UR QL STRIP.AUTO: NEGATIVE
HYALINE CASTS UR QL AUTO: ABNORMAL /LPF
KETONES UR QL STRIP: ABNORMAL
LEUKOCYTE ESTERASE UR QL STRIP.AUTO: NEGATIVE
NITRITE UR QL STRIP: NEGATIVE
PH UR STRIP.AUTO: 5.5 [PH] (ref 5–8)
PROT UR QL STRIP: ABNORMAL
PROT UR STRIP-MCNC: ABNORMAL MG/DL
RBC # UR: ABNORMAL /HPF
REF LAB TEST METHOD: ABNORMAL
SP GR UR STRIP: >=1.03 (ref 1–1.03)
SQUAMOUS #/AREA URNS HPF: ABNORMAL /HPF
UROBILINOGEN UR QL STRIP: ABNORMAL
WBC UR QL AUTO: ABNORMAL /HPF

## 2020-06-01 PROCEDURE — 59025 FETAL NON-STRESS TEST: CPT

## 2020-06-01 PROCEDURE — 99213 OFFICE O/P EST LOW 20 MIN: CPT | Performed by: OBSTETRICS & GYNECOLOGY

## 2020-06-01 PROCEDURE — 76816 OB US FOLLOW-UP PER FETUS: CPT | Performed by: OBSTETRICS & GYNECOLOGY

## 2020-06-01 NOTE — OBED NOTES
[facility] Note [facility]        Patient Name: Yana Dunham  YOB: 1985  MRN: [id number]  Admission Date: 2020  9:20 PM  Date of Service: 2020    Chief Complaint: Contractions ([facility]- pt rpt having irregular contractions all day long and are rangine from 3-5 min currently; lower back pain and pelvic pressure and sharp pain in lower abdomen ythat started 3 days ago; +FM; denies LOF or VB. Pt was seen at [facility] last nigth for r/o labor and had no cervical change and was d/c home. Pt is 250/-2)        Subjective     Yana Dunham is a 34 y.o. female  at 37w2d with Estimated Date of Delivery: 20 who presents with the chief complaint listed above.  She sees Randell Pate MD for her prenatal care. Her pregnancy has been complicated by: Small for gestational age fetus, insufficient prenatal care in the third trimester.  Patient presenting to OB ED secondary to irregular contractions all day long, only they are every 3 to 5 minutes and then contractions started approximately 3 days ago.  She was seen at [facility] last evening where she was noted to be 2 cm 50% dilated vertex -2.  She was discharged home as she made no cervical change.  She describes fetal movement as normal.  She denies rupture of membranes.  She denies vaginal bleeding.         Objective   Patient Active Problem List    Diagnosis   • SGA (small for gestational age), fetal, affecting care of mother, antepartum, third trimester, other fetus [O36.5939]   • Prenatal care, subsequent pregnancy, third trimester [Z34.83]   • Gastroesophageal reflux during pregnancy in third trimester, antepartum [O99.613, K21.9]   • Insufficient prenatal care in third trimester [O09.33]   • Rubella non-immune status, antepartum [O99.89, Z28.3]   • Request for sterilization [Z30.2]        OB History    Para Term  AB Living   7 5 5 0 1 5   SAB TAB Ectopic Molar Multiple Live Births   1 0 0 0 0 0      # Outcome Date GA  Lbr Vin/2nd Weight Sex Delivery Anes PTL Lv   7 Current            6 SAB            5 Term  38w0d          4 Term  40w0d          3 Term  38w0d          2 Term  38w0d          1 Term  38w0d               Past Medical History:   Diagnosis Date   • Abnormal Pap smear of cervix        Past Surgical History:   Procedure Laterality Date   • ADENOIDECTOMY     • CERVICAL BIOPSY  W/ LOOP ELECTRODE EXCISION     • CRYOTHERAPY     • TONSILLECTOMY         No current facility-administered medications on file prior to encounter.      Current Outpatient Medications on File Prior to Encounter   Medication Sig Dispense Refill   • calcium carbonate (TUMS) 500 MG chewable tablet Chew 2 tablets Daily.     • famotidine (PEPCID) 20 MG tablet Take 1 tablet by mouth 2 (Two) Times a Day. 60 tablet 1   • ferrous sulfate 325 (65 FE) MG tablet Take 1 tablet by mouth 2 (Two) Times a Day With Meals. 60 tablet 2   • Prenatal Vit-Fe Fumarate-FA (PRENATAL, CLASSIC, VITAMIN) 28-0.8 MG tablet tablet Take  by mouth daily.         No Known Allergies    Family History   Problem Relation Age of Onset   • Hypertension Father    • Asthma Son    • Other Son    • Breast cancer Neg Hx    • Ovarian cancer Neg Hx    • Uterine cancer Neg Hx    • Colon cancer Neg Hx        Social History     Socioeconomic History   • Marital status: Single     Spouse name: Not on file   • Number of children: Not on file   • Years of education: Not on file   • Highest education level: Not on file   Tobacco Use   • Smoking status: Light Tobacco Smoker     Packs/day: 0.25     Types: Cigarettes   • Smokeless tobacco: Never Used   Substance and Sexual Activity   • Alcohol use: No   • Drug use: No   • Sexual activity: Yes     Partners: Male           Review of Systems   Constitutional: Negative for chills and fever.   HENT: Negative.    Eyes: Negative for photophobia and visual disturbance.   Respiratory: Negative for cough, chest tightness and shortness of breath.    Cardiovascular:  "Negative for leg swelling.   Gastrointestinal: Positive for abdominal pain. Negative for diarrhea, nausea and vomiting.   Genitourinary: Negative for dysuria, flank pain, hematuria, pelvic pain, vaginal bleeding and vaginal discharge.   Musculoskeletal: Negative for back pain.   Neurological: Negative for dizziness, weakness and headaches.        PHYSICAL EXAM:      VITAL SIGNS:  Vitals:    05/31/20 2142 05/31/20 2202 05/31/20 2232 05/31/20 2302   BP:  114/68 118/69 117/69   BP Location:       Patient Position:       Pulse:  102 101 96   Resp:       Temp:       TempSrc:       Weight: 86.2 kg (190 lb)      Height: 152.4 cm (60\")           FHT'S:                   Baseline:  135 - 140 BPM  Variability:  Moderate = 6 - 25 BPM  Accelerations:  15 x 15 accelerations present     Decelerations:  absent  Contractions:   present     Interpretation:   Reactive NST, CAT 1 tracing        PHYSICAL EXAM:    General: well developed; well nourished  no acute distress   Heart: Not performed.   Lungs  Back: breathing is unlabored  CVA tenderness is absent   Abdomen: soft, non-tender; no masses  no umbilical or inguinal hernias are present  no hepato-splenomegaly       Cervix: Initial exam  Cervical Dilation (cm): 2  Cervical Effacement: 50%  Fetal Station: -2  Cervical Consistency: soft  Cervical Position: posterior    Repeat exam  Cervical Dilation (cm): 2  Cervical Effacement: 50%  Fetal Station: -2  Cervical Consistency: soft  Cervical Position: posterior   Contractions: irregular        Extremities: peripheral pulses normal, no pedal edema, no clubbing or cyanosis      LABS AND TESTING ORDERED:  1. Uterine and fetal monitoring  2. Urinalysis  3. Observation for labor over time    LAB RESULTS:    No results found for this or any previous visit (from the past 24 hour(s)).    Lab Results   Component Value Date    ABO O 01/13/2020    RH Positive 01/13/2020       Lab Results   Component Value Date    STREPGPB Negative 05/27/2020 "                 Assessment/Plan      Yana Dunham is a 34 y.o. female  at 37w2d who presented with irregular contractions and cramping    She was observed for labor but did not change her cervix which was noted to be Cervical Dilation (cm): 2 cm/ Cervical Effacement: 50% % effaced/ vertex at Fetal Station: -2 station. She was noted to have a  Reactive NST and was watched for approximately 2 hour(s).   In view of this she was discharged to home.    Final Impression:  • Pregnancy at 37w2d  • False labor after 37 weeks gestation in the third trimester  • Reactive NST, CAT 1 tracing, Reassuring fetal status  • Maternal vital signs were normal    • She was discharged to home       · She will continue her home meds          Your medication list      CONTINUE taking these medications      Instructions Last Dose Given Next Dose Due   calcium carbonate 500 MG chewable tablet  Commonly known as:  TUMS      Chew 2 tablets Daily.       famotidine 20 MG tablet  Commonly known as:  PEPCID      Take 1 tablet by mouth 2 (Two) Times a Day.       ferrous sulfate 325 (65 FE) MG tablet      Take 1 tablet by mouth 2 (Two) Times a Day With Meals.       prenatal (CLASSIC) vitamin 28-0.8 MG tablet tablet      Take  by mouth daily.              1. She will follow up with Randell Pate MD as scheduled and as needed  2. She has been instructed to return for contractions, vaginal bleeding, Rupture of membranes, decreased fetal movement or other concern  3. She may call the office or [facility] with questions.    I have spent 30 minutes including face to face time with the patient, greater than 50% in discussion of the diagnosis (counseling) and/or coordination of care.     Pastor Marvin MD  2020  23:42  OB Hospitalist  Phone:  [phone]

## 2020-06-01 NOTE — PROGRESS NOTES
CC: 37w3d IUP  Fetus remains active  Ctx q 4-7 minutes-mild  Denies LOF or bleeding  EFW 6-2 23% MIYA 16  Smoking 3 cig/d  GBS neg  A: borderline SGA  Tobacco use  Obesity  P: Kick cts  BPP next week  Disc indications for IOL

## 2020-06-03 ENCOUNTER — HOSPITAL ENCOUNTER (INPATIENT)
Facility: HOSPITAL | Age: 35
LOS: 2 days | Discharge: HOME | End: 2020-06-05
Attending: OBSTETRICS & GYNECOLOGY | Admitting: OBSTETRICS & GYNECOLOGY

## 2020-06-03 ENCOUNTER — ANESTHESIA EVENT (OUTPATIENT)
Dept: LABOR AND DELIVERY | Facility: HOSPITAL | Age: 35
End: 2020-06-03

## 2020-06-03 ENCOUNTER — ANESTHESIA (OUTPATIENT)
Dept: LABOR AND DELIVERY | Facility: HOSPITAL | Age: 35
End: 2020-06-03

## 2020-06-03 PROBLEM — Z34.90 PREGNANCY: Status: ACTIVE | Noted: 2020-06-03

## 2020-06-03 LAB
ABO GROUP BLD: NORMAL
AMPHET+METHAMPHET UR QL: NEGATIVE
BARBITURATES UR QL SCN: NEGATIVE
BASOPHILS # BLD AUTO: 0.04 10*3/MM3 (ref 0–0.2)
BASOPHILS NFR BLD AUTO: 0.4 % (ref 0–1.5)
BENZODIAZ UR QL SCN: NEGATIVE
BLD GP AB SCN SERPL QL: NEGATIVE
CANNABINOIDS SERPL QL: POSITIVE
COCAINE UR QL: NEGATIVE
DEPRECATED RDW RBC AUTO: 43.7 FL (ref 37–54)
EOSINOPHIL # BLD AUTO: 0.09 10*3/MM3 (ref 0–0.4)
EOSINOPHIL NFR BLD AUTO: 0.9 % (ref 0.3–6.2)
ERYTHROCYTE [DISTWIDTH] IN BLOOD BY AUTOMATED COUNT: 15.6 % (ref 12.3–15.4)
HCT VFR BLD AUTO: 30.4 % (ref 34–46.6)
HGB BLD-MCNC: 10.1 G/DL (ref 12–15.9)
IMM GRANULOCYTES # BLD AUTO: 0.14 10*3/MM3 (ref 0–0.05)
IMM GRANULOCYTES NFR BLD AUTO: 1.3 % (ref 0–0.5)
LYMPHOCYTES # BLD AUTO: 2.52 10*3/MM3 (ref 0.7–3.1)
LYMPHOCYTES NFR BLD AUTO: 24.3 % (ref 19.6–45.3)
MCH RBC QN AUTO: 26.1 PG (ref 26.6–33)
MCHC RBC AUTO-ENTMCNC: 33.2 G/DL (ref 31.5–35.7)
MCV RBC AUTO: 78.6 FL (ref 79–97)
METHADONE UR QL SCN: NEGATIVE
MONOCYTES # BLD AUTO: 0.71 10*3/MM3 (ref 0.1–0.9)
MONOCYTES NFR BLD AUTO: 6.8 % (ref 5–12)
NEUTROPHILS # BLD AUTO: 6.89 10*3/MM3 (ref 1.7–7)
NEUTROPHILS NFR BLD AUTO: 66.3 % (ref 42.7–76)
NRBC BLD AUTO-RTO: 0.1 /100 WBC (ref 0–0.2)
OPIATES UR QL: NEGATIVE
OXYCODONE UR QL SCN: NEGATIVE
PLATELET # BLD AUTO: 319 10*3/MM3 (ref 140–450)
PMV BLD AUTO: 8.9 FL (ref 6–12)
RBC # BLD AUTO: 3.87 10*6/MM3 (ref 3.77–5.28)
RH BLD: POSITIVE
SARS-COV-2 RNA RESP QL NAA+PROBE: NOT DETECTED
T&S EXPIRATION DATE: NORMAL
WBC NRBC COR # BLD: 10.39 10*3/MM3 (ref 3.4–10.8)

## 2020-06-03 PROCEDURE — 80307 DRUG TEST PRSMV CHEM ANLYZR: CPT | Performed by: OBSTETRICS & GYNECOLOGY

## 2020-06-03 PROCEDURE — 87635 SARS-COV-2 COVID-19 AMP PRB: CPT | Performed by: OBSTETRICS & GYNECOLOGY

## 2020-06-03 PROCEDURE — C1755 CATHETER, INTRASPINAL: HCPCS

## 2020-06-03 PROCEDURE — S0260 H&P FOR SURGERY: HCPCS | Performed by: OBSTETRICS & GYNECOLOGY

## 2020-06-03 PROCEDURE — 88307 TISSUE EXAM BY PATHOLOGIST: CPT

## 2020-06-03 PROCEDURE — 86850 RBC ANTIBODY SCREEN: CPT | Performed by: OBSTETRICS & GYNECOLOGY

## 2020-06-03 PROCEDURE — C1755 CATHETER, INTRASPINAL: HCPCS | Performed by: ANESTHESIOLOGY

## 2020-06-03 PROCEDURE — 59410 OBSTETRICAL CARE: CPT | Performed by: OBSTETRICS & GYNECOLOGY

## 2020-06-03 PROCEDURE — 86900 BLOOD TYPING SEROLOGIC ABO: CPT | Performed by: OBSTETRICS & GYNECOLOGY

## 2020-06-03 PROCEDURE — 85025 COMPLETE CBC W/AUTO DIFF WBC: CPT | Performed by: OBSTETRICS & GYNECOLOGY

## 2020-06-03 PROCEDURE — G0480 DRUG TEST DEF 1-7 CLASSES: HCPCS | Performed by: OBSTETRICS & GYNECOLOGY

## 2020-06-03 PROCEDURE — 86901 BLOOD TYPING SEROLOGIC RH(D): CPT | Performed by: OBSTETRICS & GYNECOLOGY

## 2020-06-03 RX ORDER — SODIUM CHLORIDE 0.9 % (FLUSH) 0.9 %
3 SYRINGE (ML) INJECTION EVERY 12 HOURS SCHEDULED
Status: DISCONTINUED | OUTPATIENT
Start: 2020-06-03 | End: 2020-06-03

## 2020-06-03 RX ORDER — DOCUSATE SODIUM 100 MG/1
100 CAPSULE, LIQUID FILLED ORAL 2 TIMES DAILY
Status: DISCONTINUED | OUTPATIENT
Start: 2020-06-03 | End: 2020-06-05 | Stop reason: HOSPADM

## 2020-06-03 RX ORDER — OXYTOCIN-SODIUM CHLORIDE 0.9% IV SOLN 30 UNIT/500ML 30-0.9/5 UT/ML-%
999 SOLUTION INTRAVENOUS ONCE
Status: COMPLETED | OUTPATIENT
Start: 2020-06-03 | End: 2020-06-03

## 2020-06-03 RX ORDER — HYDROCODONE BITARTRATE AND ACETAMINOPHEN 5; 325 MG/1; MG/1
1 TABLET ORAL EVERY 4 HOURS PRN
Status: DISCONTINUED | OUTPATIENT
Start: 2020-06-03 | End: 2020-06-03

## 2020-06-03 RX ORDER — METHYLERGONOVINE MALEATE 0.2 MG/ML
200 INJECTION INTRAVENOUS ONCE AS NEEDED
Status: DISCONTINUED | OUTPATIENT
Start: 2020-06-03 | End: 2020-06-05 | Stop reason: HOSPADM

## 2020-06-03 RX ORDER — DIPHENHYDRAMINE HCL 25 MG
25 CAPSULE ORAL NIGHTLY PRN
Status: DISCONTINUED | OUTPATIENT
Start: 2020-06-03 | End: 2020-06-05 | Stop reason: HOSPADM

## 2020-06-03 RX ORDER — LIDOCAINE HYDROCHLORIDE 10 MG/ML
5 INJECTION, SOLUTION EPIDURAL; INFILTRATION; INTRACAUDAL; PERINEURAL AS NEEDED
Status: DISCONTINUED | OUTPATIENT
Start: 2020-06-03 | End: 2020-06-03

## 2020-06-03 RX ORDER — OXYCODONE HYDROCHLORIDE AND ACETAMINOPHEN 5; 325 MG/1; MG/1
1 TABLET ORAL EVERY 4 HOURS PRN
Status: DISCONTINUED | OUTPATIENT
Start: 2020-06-03 | End: 2020-06-05 | Stop reason: HOSPADM

## 2020-06-03 RX ORDER — SODIUM CHLORIDE 0.9 % (FLUSH) 0.9 %
10 SYRINGE (ML) INJECTION AS NEEDED
Status: DISCONTINUED | OUTPATIENT
Start: 2020-06-03 | End: 2020-06-03

## 2020-06-03 RX ORDER — LANOLIN
CREAM (ML) TOPICAL
Status: DISCONTINUED | OUTPATIENT
Start: 2020-06-03 | End: 2020-06-05 | Stop reason: HOSPADM

## 2020-06-03 RX ORDER — HYDROCORTISONE 25 MG/G
1 CREAM TOPICAL AS NEEDED
Status: DISCONTINUED | OUTPATIENT
Start: 2020-06-03 | End: 2020-06-05 | Stop reason: HOSPADM

## 2020-06-03 RX ORDER — DIPHENHYDRAMINE HYDROCHLORIDE 50 MG/ML
12.5 INJECTION INTRAMUSCULAR; INTRAVENOUS EVERY 8 HOURS PRN
Status: DISCONTINUED | OUTPATIENT
Start: 2020-06-03 | End: 2020-06-03

## 2020-06-03 RX ORDER — IBUPROFEN 600 MG/1
600 TABLET ORAL EVERY 8 HOURS PRN
Status: DISCONTINUED | OUTPATIENT
Start: 2020-06-03 | End: 2020-06-05 | Stop reason: HOSPADM

## 2020-06-03 RX ORDER — ERYTHROMYCIN 5 MG/G
OINTMENT OPHTHALMIC
Status: DISPENSED
Start: 2020-06-03 | End: 2020-06-04

## 2020-06-03 RX ORDER — CARBOPROST TROMETHAMINE 250 UG/ML
250 INJECTION, SOLUTION INTRAMUSCULAR AS NEEDED
Status: DISCONTINUED | OUTPATIENT
Start: 2020-06-03 | End: 2020-06-03

## 2020-06-03 RX ORDER — METHYLERGONOVINE MALEATE 0.2 MG/ML
200 INJECTION INTRAVENOUS ONCE AS NEEDED
Status: DISCONTINUED | OUTPATIENT
Start: 2020-06-03 | End: 2020-06-03

## 2020-06-03 RX ORDER — LIDOCAINE HYDROCHLORIDE AND EPINEPHRINE 15; 5 MG/ML; UG/ML
INJECTION, SOLUTION EPIDURAL AS NEEDED
Status: DISCONTINUED | OUTPATIENT
Start: 2020-06-03 | End: 2020-06-03 | Stop reason: SURG

## 2020-06-03 RX ORDER — OXYTOCIN-SODIUM CHLORIDE 0.9% IV SOLN 30 UNIT/500ML 30-0.9/5 UT/ML-%
125 SOLUTION INTRAVENOUS CONTINUOUS PRN
Status: COMPLETED | OUTPATIENT
Start: 2020-06-03 | End: 2020-06-03

## 2020-06-03 RX ORDER — SODIUM CHLORIDE 0.9 % (FLUSH) 0.9 %
1-10 SYRINGE (ML) INJECTION AS NEEDED
Status: DISCONTINUED | OUTPATIENT
Start: 2020-06-03 | End: 2020-06-05 | Stop reason: HOSPADM

## 2020-06-03 RX ORDER — PHYTONADIONE 1 MG/.5ML
INJECTION, EMULSION INTRAMUSCULAR; INTRAVENOUS; SUBCUTANEOUS
Status: DISPENSED
Start: 2020-06-03 | End: 2020-06-04

## 2020-06-03 RX ORDER — MISOPROSTOL 200 UG/1
800 TABLET ORAL AS NEEDED
Status: DISCONTINUED | OUTPATIENT
Start: 2020-06-03 | End: 2020-06-03

## 2020-06-03 RX ORDER — OXYTOCIN-SODIUM CHLORIDE 0.9% IV SOLN 30 UNIT/500ML 30-0.9/5 UT/ML-%
SOLUTION INTRAVENOUS
Status: COMPLETED
Start: 2020-06-03 | End: 2020-06-03

## 2020-06-03 RX ORDER — ONDANSETRON 4 MG/1
4 TABLET, FILM COATED ORAL EVERY 8 HOURS PRN
Status: DISCONTINUED | OUTPATIENT
Start: 2020-06-03 | End: 2020-06-05 | Stop reason: HOSPADM

## 2020-06-03 RX ORDER — FAMOTIDINE 10 MG/ML
20 INJECTION, SOLUTION INTRAVENOUS ONCE AS NEEDED
Status: DISCONTINUED | OUTPATIENT
Start: 2020-06-03 | End: 2020-06-03

## 2020-06-03 RX ORDER — EPHEDRINE SULFATE 50 MG/ML
5 INJECTION, SOLUTION INTRAVENOUS AS NEEDED
Status: DISCONTINUED | OUTPATIENT
Start: 2020-06-03 | End: 2020-06-03

## 2020-06-03 RX ORDER — BISACODYL 10 MG
10 SUPPOSITORY, RECTAL RECTAL DAILY PRN
Status: DISCONTINUED | OUTPATIENT
Start: 2020-06-04 | End: 2020-06-05 | Stop reason: HOSPADM

## 2020-06-03 RX ORDER — OXYTOCIN-SODIUM CHLORIDE 0.9% IV SOLN 30 UNIT/500ML 30-0.9/5 UT/ML-%
250 SOLUTION INTRAVENOUS CONTINUOUS
Status: DISPENSED | OUTPATIENT
Start: 2020-06-03 | End: 2020-06-03

## 2020-06-03 RX ORDER — SODIUM CHLORIDE, SODIUM LACTATE, POTASSIUM CHLORIDE, CALCIUM CHLORIDE 600; 310; 30; 20 MG/100ML; MG/100ML; MG/100ML; MG/100ML
125 INJECTION, SOLUTION INTRAVENOUS CONTINUOUS
Status: DISCONTINUED | OUTPATIENT
Start: 2020-06-03 | End: 2020-06-03

## 2020-06-03 RX ORDER — ONDANSETRON 2 MG/ML
4 INJECTION INTRAMUSCULAR; INTRAVENOUS ONCE AS NEEDED
Status: DISCONTINUED | OUTPATIENT
Start: 2020-06-03 | End: 2020-06-03

## 2020-06-03 RX ADMIN — OXYCODONE AND ACETAMINOPHEN 1 TABLET: 5; 325 TABLET ORAL at 20:39

## 2020-06-03 RX ADMIN — Medication 10 ML/HR: at 11:58

## 2020-06-03 RX ADMIN — OXYTOCIN 125 ML/HR: 10 INJECTION, SOLUTION INTRAMUSCULAR; INTRAVENOUS at 14:06

## 2020-06-03 RX ADMIN — Medication: at 16:59

## 2020-06-03 RX ADMIN — OXYTOCIN-SODIUM CHLORIDE 0.9% IV SOLN 30 UNIT/500ML 999 ML/HR: 30-0.9/5 SOLUTION at 12:45

## 2020-06-03 RX ADMIN — DOCUSATE SODIUM 100 MG: 100 CAPSULE, LIQUID FILLED ORAL at 20:39

## 2020-06-03 RX ADMIN — OXYTOCIN 999 ML/HR: 10 INJECTION, SOLUTION INTRAMUSCULAR; INTRAVENOUS at 12:45

## 2020-06-03 RX ADMIN — IBUPROFEN 600 MG: 600 TABLET ORAL at 16:10

## 2020-06-03 RX ADMIN — LIDOCAINE HYDROCHLORIDE AND EPINEPHRINE 3 ML: 15; 5 INJECTION, SOLUTION EPIDURAL at 11:52

## 2020-06-03 RX ADMIN — HYDROCORTISONE 1 APPLICATION: 25 CREAM TOPICAL at 16:59

## 2020-06-03 RX ADMIN — SODIUM CHLORIDE, POTASSIUM CHLORIDE, SODIUM LACTATE AND CALCIUM CHLORIDE 1000 ML: 600; 310; 30; 20 INJECTION, SOLUTION INTRAVENOUS at 11:35

## 2020-06-03 NOTE — PLAN OF CARE
Problem: Patient Care Overview  Goal: Plan of Care Review  Outcome: Ongoing (interventions implemented as appropriate)  Flowsheets (Taken 6/3/2020 9379)  Plan of Care Reviewed With: patient;spouse  Goal: Individualization and Mutuality  Outcome: Ongoing (interventions implemented as appropriate)  Goal: Discharge Needs Assessment  Outcome: Ongoing (interventions implemented as appropriate)  Goal: Interprofessional Rounds/Family Conf  Outcome: Ongoing (interventions implemented as appropriate)     Problem: Fall Risk,  (Adult,Obstetrics,Pediatric)  Goal: Identify Related Risk Factors and Signs and Symptoms  Outcome: Ongoing (interventions implemented as appropriate)  Goal: Absence of Maternal Fall  Outcome: Ongoing (interventions implemented as appropriate)  Goal: Absence of [place] Fall/Drop  Outcome: Ongoing (interventions implemented as appropriate)     Problem: Skin Injury Risk (Adult)  Goal: Identify Related Risk Factors and Signs and Symptoms  Outcome: Ongoing (interventions implemented as appropriate)  Goal: Skin Health and Integrity  Outcome: Ongoing (interventions implemented as appropriate)     Problem: Anesthesia/Analgesia, Neuraxial (Obstetrics)  Goal: Signs and Symptoms of Listed Potential Problems Will be Absent, Minimized or Managed (Anesthesia/Analgesia, Neuraxial)  Outcome: Ongoing (interventions implemented as appropriate)     Problem: Postpartum (Vaginal Delivery) (Adult,Obstetrics,Pediatric)  Goal: Signs and Symptoms of Listed Potential Problems Will be Absent, Minimized or Managed (Postpartum)  Outcome: Ongoing (interventions implemented as appropriate)  Flowsheets (Taken 6/3/2020 2745)  Problems Assessed (Postpartum Vaginal Delivery): all  Problems Present (Postpartum Vag Deliv): pain  Note:   UTERINE cramping associated with pitocin administeration

## 2020-06-03 NOTE — NURSING NOTE
Dr. Rodriguez notified of +THC in UDS. MD already aware of positive UDS 4/20/20. D/t in consistent prenatal care and +UDS orders for  consult.

## 2020-06-03 NOTE — ANESTHESIA PROCEDURE NOTES
Labor Epidural      Patient reassessed immediately prior to procedure    Patient location during procedure: OB  Start Time: 6/3/2020 11:40 AM  Stop Time: 6/3/2020 11:52 AM  Performed By  Anesthesiologist: Kenan Valdez MD  Preanesthetic Checklist  Completed: patient identified, site marked, surgical consent, pre-op evaluation, timeout performed, IV checked, risks and benefits discussed and monitors and equipment checked  Prep:  Pt Position:sitting  Sterile Tech:cap, gloves, mask and sterile barrier  Prep:chlorhexidine gluconate and isopropyl alcohol  Monitoring:blood pressure monitoring, continuous pulse oximetry and EKG  Epidural Block Procedure:  Approach:midline  Guidance:landmark technique  Location:L4-L5  Needle Type:Tuohy  Needle Gauge:17 G  Loss of Resistance Medium: saline  Loss of Resistance: 7cm  Cath Depth at skin:13 cm  Paresthesia: none  Aspiration:negative  Test Dose:negative  Number of Attempts: 1  Post Assessment:  Dressing:occlusive dressing applied and secured with tape  Pt Tolerance:patient tolerated the procedure well with no apparent complications  Complications:no

## 2020-06-03 NOTE — NURSING NOTE
"RN at bedside for fundal exam. RN reminded FOB again to please keep mask on while care providers in room. While there pt stated that the epidural \"broke\" I asked her where it was broken and she showed me the epidural catheter that was snapped in half. PT stated the FOB was in the bed with her and he accidentally snapped the catheter in half when he got tangled in it. FOB was educated on importance on not laying in bed while pt is connected to different machines. Epidural catheter was removed d/t infection risk with catheter having open end and no way to cap it off.   "

## 2020-06-03 NOTE — H&P
H&P Note    Patient Identification:  Name: Yana Dunham  Age: 34 y.o.  Sex: female  :  1985  MRN: [id number]                       Chief Complaint: Active labor    History of Present Illness:   34-year-old  7 para 5 presents at 37-5/7 weeks in active labor.  Group B strep status is negative.  Pregnancy has been complicated by late onset prenatal care.    Problem List:  [unfilled]  Past Medical History:  Past Medical History:   Diagnosis Date   • Abnormal Pap smear of cervix     current WNL     Past Surgical History:  Past Surgical History:   Procedure Laterality Date   • ADENOIDECTOMY     • CERVICAL BIOPSY  W/ LOOP ELECTRODE EXCISION     • CRYOTHERAPY     • TONSILLECTOMY        Home Meds:  Medications Prior to Admission   Medication Sig Dispense Refill Last Dose   • calcium carbonate (TUMS) 500 MG chewable tablet Chew 2 tablets Daily.   Past Week at Unknown time   • famotidine (PEPCID) 20 MG tablet Take 1 tablet by mouth 2 (Two) Times a Day. 60 tablet 1 2020 at Unknown time   • ferrous sulfate 325 (65 FE) MG tablet Take 1 tablet by mouth 2 (Two) Times a Day With Meals. 60 tablet 2 Past Week at Unknown time   • Prenatal Vit-Fe Fumarate-FA (PRENATAL, CLASSIC, VITAMIN) 28-0.8 MG tablet tablet Take  by mouth daily.   Past Week at Unknown time     Current Meds:   [unfilled]  Allergies:  No Known Allergies  Immunizations:    There is no immunization history on file for this patient.  Social History:   Social History     Tobacco Use   • Smoking status: Light Tobacco Smoker     Packs/day: 0.25     Types: Cigarettes   • Smokeless tobacco: Never Used   Substance Use Topics   • Alcohol use: No      Family History:  Family History   Problem Relation Age of Onset   • Hypertension Father    • Asthma Son    • Other Son    • Breast cancer Neg Hx    • Ovarian cancer Neg Hx    • Uterine cancer Neg Hx    • Colon cancer Neg Hx         Review of Systems  A comprehensive review of systems was  negative.    Objective:  tMax 24 hrs: Temp (24hrs), Av.7 °F (36.5 °C), Min:97.5 °F (36.4 °C), Max:97.9 °F (36.6 °C)    Vitals Ranges:   Temp:  [97.5 °F (36.4 °C)-97.9 °F (36.6 °C)] 97.9 °F (36.6 °C)  Heart Rate:  [] 81  Resp:  [17-20] 17  BP: ()/(56-91) 117/72  Intake and Output Last 3 Shifts:   No intake/output data recorded.    Exam:     General Appearance:    Alert, cooperative, no distress, appears stated age   Head:    Normocephalic, without obvious abnormality, atraumatic   Back:     Symmetric, no curvature, ROM normal, no CVA tenderness   Lungs:     Clear to auscultation bilaterally, respirations unlabored   Chest Wall:    No tenderness or deformity    Heart:    Regular rate and rhythm, S1 and S2 normal, no murmur, rub   or gallop       Abdomen:     Soft, non-tender, bowel sounds active all four quadrants,     no masses, no organomegaly           Extremities:   Extremities normal, atraumatic, no cyanosis or edema   Skin:   Skin color, texture, turgor normal, no rashes or lesions   Lymph nodes:   Cervical, supraclavicular, and axillary nodes normal       Data Review:    Lab Results (last 24 hours)     Procedure Component Value Units Date/Time    URINE DRUG SCREEN PLUS BUPRENORPHINE - [[id number]] Collected:  20 122     Updated:  20 1305    Narrative:       The following orders were created for panel order URINE DRUG SCREEN PLUS BUPRENORPHINE -.  Procedure                               Abnormality         Status                     ---------                               -----------         ------                     Urine Drug Screen - Urin...[[id number]]  Abnormal            Final result               Buprenorphine Screen Uri...[[id number]]                      In process                   Please view results for these tests on the individual orders.    Urine Drug Screen - Urine, Clean Catch [[id number]]  (Abnormal) Collected:  20 1222    Specimen:  Urine, Clean Catch Updated:   06/03/20 1305     Amphet/Methamphet, Screen Negative     Barbiturates Screen, Urine Negative     Benzodiazepine Screen, Urine Negative     Cocaine Screen, Urine Negative     Opiate Screen Negative     THC, Screen, Urine Positive     Methadone Screen, Urine Negative     Oxycodone Screen, Urine Negative    Narrative:       Negative Thresholds For Drugs Screened:     Amphetamines               500 ng/ml   Barbiturates               200 ng/ml   Benzodiazepines            100 ng/ml   Cocaine                    300 ng/ml   Methadone                  300 ng/ml   Opiates                    300 ng/ml   Oxycodone                  100 ng/ml   THC                        50 ng/ml    The Normal Value for all drugs tested is negative. This report includes final unconfirmed screening results to be used for medical treatment purposes only. Unconfirmed results must not be used for non-medical purposes such as employment or legal testing. Clinical consideration should be applied to any drug of abuse test, particulary when unconfirmed results are used.    THC (marijuana), Urine, Confirmation - Urine, Clean Catch [[id number]] Collected:  06/03/20 1222    Specimen:  Urine, Clean Catch Updated:  06/03/20 1305    Buprenorphine Screen Urine - Urine, Clean Catch [[id number]] Collected:  06/03/20 1222    Specimen:  Urine, Clean Catch Updated:  06/03/20 1225    COVID PRE-OP / PRE-PROCEDURE SCREENING ORDER (NO ISOLATION) - Swab, Nasopharynx [[id number]] Collected:  06/03/20 1137    Specimen:  Swab from Nasopharynx Updated:  06/03/20 1211    Narrative:       The following orders were created for panel order COVID PRE-OP / PRE-PROCEDURE SCREENING ORDER (NO ISOLATION) - Swab, Nasopharynx.  Procedure                               Abnormality         Status                     ---------                               -----------         ------                     COVID-19, ABBOTT IN-HOUS...[[id number]]  Normal              Final result                  Please view results for these tests on the individual orders.    COVID-19, ABBOTT IN-HOUSE,NP Swab (NO TRANSPORT MEDIA) 2 HR TAT - Swab, Nasopharynx [[id number]]  (Normal) Collected:  06/03/20 1137    Specimen:  Swab from Nasopharynx Updated:  06/03/20 1211     COVID19 Not Detected    CBC & Differential [[id number]] Collected:  06/03/20 1137    Specimen:  Blood Updated:  06/03/20 1205    Narrative:       The following orders were created for panel order CBC & Differential.  Procedure                               Abnormality         Status                     ---------                               -----------         ------                     CBC Auto Differential[[id number]]        Abnormal            Final result                 Please view results for these tests on the individual orders.    CBC Auto Differential [[id number]]  (Abnormal) Collected:  06/03/20 1137    Specimen:  Blood Updated:  06/03/20 1205     WBC 10.39 10*3/mm3      RBC 3.87 10*6/mm3      Hemoglobin 10.1 g/dL      Hematocrit 30.4 %      MCV 78.6 fL      MCH 26.1 pg      MCHC 33.2 g/dL      RDW 15.6 %      RDW-SD 43.7 fl      MPV 8.9 fL      Platelets 319 10*3/mm3      Neutrophil % 66.3 %      Lymphocyte % 24.3 %      Monocyte % 6.8 %      Eosinophil % 0.9 %      Basophil % 0.4 %      Immature Grans % 1.3 %      Neutrophils, Absolute 6.89 10*3/mm3      Lymphocytes, Absolute 2.52 10*3/mm3      Monocytes, Absolute 0.71 10*3/mm3      Eosinophils, Absolute 0.09 10*3/mm3      Basophils, Absolute 0.04 10*3/mm3      Immature Grans, Absolute 0.14 10*3/mm3      nRBC 0.1 /100 WBC         Assessment:    Pregnancy      1.  Intrauterine pregnancy at 37-5/7 weeks  2.  Active labor    Plan:  Epidural per the patient's request.  Anticipate a vaginal delivery    Servando Rodriguez MD  6/3/2020

## 2020-06-03 NOTE — L&D DELIVERY NOTE
Delivery Note    Obstetrician:   Servando Rodriguez MD      Pre-Delivery Diagnosis: 1.  Intrauterine pregnancy at 37-5/7 weeks  2.  Active labor    Post-Delivery Diagnosis: Same    Procedure: Spontaneous vaginal delivery  34-year-old  7 para 5 presented at 37-5/7 weeks in active labor.  Fetal heart tones were reactive.  Group B strep status was negative.  The patient was admitted and an epidural catheter was placed for pain control.  Amniotomy was performed at 9-1/2 cm with return of clear fluid.      The patient progressed to complete effacement and dilatation as well as +1 station of the presenting part.  She then began to push.  She pushed to spontaneous vaginal delivery of the fetal head from left occiput anterior presentation.  The mouth and naris were suctioned with a bulb while on the perineum.  The shoulders responded to gentle downward traction along with Luciana maneuver.  Time between delivery of the head and shoulders was less than 15 seconds.  The remainder of the infant was delivered.  After delay of 30 seconds, the cord was doubly clamped and divided.  The infant was then placed on the mother's chest for Kangaroo care.  The baby moved all 4 extremities while in my arms.      The placenta  spontaneously.  It was intact and had a three-vessel cord.  The perineum was inspected.  It was intact.  The cervix was inspected.  It was also intact.  Estimated blood loss 350 cc.      Episiotomy or Incision: none      Apgars: 1' 8  /  5' 9     Placenta and Cord:          Mechanism: spontaneous        Description:  complete    Estimated Blood Loss:  350cc                             Complications:  None           Condition: Stable    Male infant    6/3/2020  Servando Rodriguez MD

## 2020-06-03 NOTE — ANESTHESIA PREPROCEDURE EVALUATION
Anesthesia Evaluation     Patient summary reviewed and Nursing notes reviewed   no history of anesthetic complications:  NPO Solid Status: > 8 hours  NPO Liquid Status: > 8 hours           Airway   Mallampati: II  TM distance: >3 FB  Neck ROM: full  No difficulty expected  Dental - normal exam     Pulmonary    (-) rhonchi, decreased breath sounds, wheezes, not a smoker  Cardiovascular   Exercise tolerance: good (4-7 METS)    Rhythm: regular  Rate: normal    (-) hypertension, CAD, angina, GALINDO, murmur      Neuro/Psych  (-) CVA  GI/Hepatic/Renal/Endo    (+) obesity,  GERD,    (-) no renal disease, diabetes    Musculoskeletal     Abdominal     Abdomen: soft.   Substance History      OB/GYN    (+) Pregnant (multigravida),         Other                        Anesthesia Plan    ASA 2     epidural   (37w5d)    Anesthetic plan, all risks, benefits, and alternatives have been provided, discussed and informed consent has been obtained with: patient.

## 2020-06-03 NOTE — PLAN OF CARE
Problem: Patient Care Overview  Goal: Plan of Care Review  6/3/2020 1321 by Shelly Singletary RN  Outcome: Ongoing (interventions implemented as appropriate)  Flowsheets  Taken 6/3/2020 1215  Progress: improving  Outcome Summary: Pt comfortable with epidural. AROM blood tinged fluid. SVE 9.5/100/0. Will continue with current POC for   Taken 6/3/2020 1144  Plan of Care Reviewed With: significant other;patient     Problem: Patient Care Overview  Goal: Individualization and Mutuality  6/3/2020 1321 by Shelly Singletary, RN  Outcome: Ongoing (interventions implemented as appropriate)  Flowsheets (Taken 6/3/2020 1206)  Patient Specific Goals (Include Timeframe): safe and healthy delivery  How to Address Anxieties/Fears: none  Patient Specific Interventions: breastfeeding, epidural  What Information Would Help Us Give You More Personalized Care?: none  How Would You and/or Your Support Person Like to Participate in Your Care?: remain at bedside and active in care  What Anxieties, Fears, Concerns, or Questions Do You Have About Your Care?: none  Patient Specific Preferences: breastfeeding, epidural     Problem: Patient Care Overview  Goal: Discharge Needs Assessment  6/3/2020 1321 by Shelly Singletary RN  Outcome: Ongoing (interventions implemented as appropriate)  Flowsheets  Taken 6/3/2020 1143  Equipment Needed After Discharge: none  Anticipated Changes Related to Illness: none  Transportation Anticipated: car, drives self  Transportation Concerns: car, none  Concerns to be Addressed: no discharge needs identified  Readmission Within the Last 30 Days: no previous admission in last 30 days  Patient/Family Anticipated Services at Transition: none  Patient/Family Anticipates Transition to: home  Taken 6/3/2020 1144  Equipment Currently Used at Home: none     Problem: Patient Care Overview  Goal: Interprofessional Rounds/Family Conf  6/3/2020 1321 by Shelly Singletary, RN  Outcome: Ongoing (interventions implemented as  appropriate)     Problem: Labor (Cervical Ripen, Induct, Augment) (Adult,Obstetrics,Pediatric)  Goal: Signs and Symptoms of Listed Potential Problems Will be Absent, Minimized or Managed (Labor)  6/3/2020 1321 by Shelly Singletary RN  Outcome: Ongoing (interventions implemented as appropriate)  Flowsheets (Taken 6/3/2020 1144)  Problems Assessed (Labor): all  Problems Present (Labor): none     Problem: Fall Risk,  (Adult,Obstetrics,Pediatric)  Goal: Identify Related Risk Factors and Signs and Symptoms  Outcome: Ongoing (interventions implemented as appropriate)  Flowsheets (Taken 6/3/2020 1215)  Related Risk Factors (Fall Risk, ): regional anesthesia; pain severe  Signs and Symptoms (Fall Risk, ): presence of fall risk factors     Problem: Fall Risk,  (Adult,Obstetrics,Pediatric)  Goal: Absence of Maternal Fall  Outcome: Ongoing (interventions implemented as appropriate)  Flowsheets (Taken 6/3/2020 1215)  Absence of Maternal Fall: making progress toward outcome     Problem: Fall Risk,  (Adult,Obstetrics,Pediatric)  Goal: Absence of  Fall/Drop  Outcome: Ongoing (interventions implemented as appropriate)     Problem: Skin Injury Risk (Adult)  Goal: Identify Related Risk Factors and Signs and Symptoms  Outcome: Ongoing (interventions implemented as appropriate)  Flowsheets (Taken 6/3/2020 1215)  Related Risk Factors (Skin Injury Risk): mobility impaired; medication     Problem: Skin Injury Risk (Adult)  Goal: Skin Health and Integrity  Outcome: Ongoing (interventions implemented as appropriate)  Flowsheets (Taken 6/3/2020 1215)  Skin Health and Integrity: making progress toward outcome     Problem: Anesthesia/Analgesia, Neuraxial (Obstetrics)  Goal: Signs and Symptoms of Listed Potential Problems Will be Absent, Minimized or Managed (Anesthesia/Analgesia, Neuraxial)  Outcome: Ongoing (interventions implemented as appropriate)  Flowsheets (Taken 6/3/2020 1215)  Problems  Assessed (Neuraxial Anesthesia/Analgesia, OB): all  Problems Present (Neuraxial Anesth OB): none

## 2020-06-03 NOTE — PROGRESS NOTES
Continued Stay Note  [facility]     Patient Name: Yana Dunham  MRN: [id number]  Today's Date: 6/3/2020    Admit Date: 6/3/2020    Discharge Plan     Row Name 06/03/20 1531       Plan    Plan  CSW to follow up with CPS an follow for cord tox results. Harriet Pandya CSW.     Plan Comments  Mother: Yana Dunham, MRN: [id number]. Infant Shahida Dunham, MRN: [id number]. CSW reviewed mother's chart and RN Dilma Barron spoke with CSW related to concerns of positive urine drug screen on 4/20/2020 for amphetmaines/methamphetamines and positive urine drug screen on admission for THC. CSW reported to CPS due to positive urine drug screens. CSW spoke to the hotline who stated that mother had a case closed in December of 2019. CPS report ID # is [id number]. CSW to follow up on acceptance by CPS, DC recommendations, and cord toxicology results. CSW to complete assessment with mother tomorrow due to recent delivery. Harriet AGEEW.         Discharge Codes    No documentation.             Harriet Pandya

## 2020-06-03 NOTE — LACTATION NOTE
P6. Baby boy is 37w5d. Mom is concerned about pumping but her breast pump has not arrived. Enc her to call her insurance company as she recalls filling out and faxing paper at her Dr.s office. She denies breastfeeding questions at this time.

## 2020-06-03 NOTE — PLAN OF CARE
Problem: Patient Care Overview  Goal: Plan of Care Review  Outcome: Ongoing (interventions implemented as appropriate)  Flowsheets (Taken 6/3/2020 1144)  Progress: improving  Outcome Summary:  at 37+5wks arrives in triage with labor. SVE 5/100/0 with bulging bag. PT pain 10/10 epidural requested. Will continue with current POC for   Goal: Individualization and Mutuality  Outcome: Ongoing (interventions implemented as appropriate)  Flowsheets (Taken 6/3/2020 1206)  Patient Specific Goals (Include Timeframe): safe and healthy delivery  How to Address Anxieties/Fears: none  Patient Specific Interventions: breastfeeding, epidural  What Information Would Help Us Give You More Personalized Care?: none  How Would You and/or Your Support Person Like to Participate in Your Care?: remain at bedside and active in care  What Anxieties, Fears, Concerns, or Questions Do You Have About Your Care?: none  Patient Specific Preferences: breastfeeding, epidural  Goal: Discharge Needs Assessment  Outcome: Ongoing (interventions implemented as appropriate)  Flowsheets (Taken 6/3/2020 1143)  Equipment Needed After Discharge: none  Anticipated Changes Related to Illness: none  Transportation Anticipated: car, drives self  Transportation Concerns: car, none  Concerns to be Addressed: no discharge needs identified  Readmission Within the Last 30 Days: no previous admission in last 30 days  Patient/Family Anticipated Services at Transition: none  Patient/Family Anticipates Transition to: home  Goal: Interprofessional Rounds/Family Conf  Outcome: Ongoing (interventions implemented as appropriate)     Problem: Labor (Cervical Ripen, Induct, Augment) (Adult,Obstetrics,Pediatric)  Goal: Signs and Symptoms of Listed Potential Problems Will be Absent, Minimized or Managed (Labor)  Outcome: Ongoing (interventions implemented as appropriate)  Flowsheets (Taken 6/3/2020 1144)  Problems Assessed (Labor): all  Problems Present (Labor): none

## 2020-06-04 LAB
BASOPHILS # BLD AUTO: 0.04 10*3/MM3 (ref 0–0.2)
BASOPHILS NFR BLD AUTO: 0.3 % (ref 0–1.5)
BUPRENORPHINE UR QL: NEGATIVE NG/ML
DEPRECATED RDW RBC AUTO: 45 FL (ref 37–54)
EOSINOPHIL # BLD AUTO: 0.15 10*3/MM3 (ref 0–0.4)
EOSINOPHIL NFR BLD AUTO: 1.2 % (ref 0.3–6.2)
ERYTHROCYTE [DISTWIDTH] IN BLOOD BY AUTOMATED COUNT: 15.5 % (ref 12.3–15.4)
HCT VFR BLD AUTO: 27 % (ref 34–46.6)
HGB BLD-MCNC: 9.1 G/DL (ref 12–15.9)
IMM GRANULOCYTES # BLD AUTO: 0.14 10*3/MM3 (ref 0–0.05)
IMM GRANULOCYTES NFR BLD AUTO: 1.1 % (ref 0–0.5)
LYMPHOCYTES # BLD AUTO: 2.72 10*3/MM3 (ref 0.7–3.1)
LYMPHOCYTES NFR BLD AUTO: 21.3 % (ref 19.6–45.3)
MCH RBC QN AUTO: 26.8 PG (ref 26.6–33)
MCHC RBC AUTO-ENTMCNC: 33.7 G/DL (ref 31.5–35.7)
MCV RBC AUTO: 79.6 FL (ref 79–97)
MONOCYTES # BLD AUTO: 0.76 10*3/MM3 (ref 0.1–0.9)
MONOCYTES NFR BLD AUTO: 6 % (ref 5–12)
NEUTROPHILS # BLD AUTO: 8.96 10*3/MM3 (ref 1.7–7)
NEUTROPHILS NFR BLD AUTO: 70.1 % (ref 42.7–76)
NRBC BLD AUTO-RTO: 0.1 /100 WBC (ref 0–0.2)
PLATELET # BLD AUTO: 274 10*3/MM3 (ref 140–450)
PMV BLD AUTO: 9.3 FL (ref 6–12)
RBC # BLD AUTO: 3.39 10*6/MM3 (ref 3.77–5.28)
WBC NRBC COR # BLD: 12.77 10*3/MM3 (ref 3.4–10.8)

## 2020-06-04 PROCEDURE — 99024 POSTOP FOLLOW-UP VISIT: CPT | Performed by: OBSTETRICS & GYNECOLOGY

## 2020-06-04 PROCEDURE — 85025 COMPLETE CBC W/AUTO DIFF WBC: CPT | Performed by: OBSTETRICS & GYNECOLOGY

## 2020-06-04 RX ORDER — FERROUS SULFATE 325(65) MG
325 TABLET ORAL
Status: DISCONTINUED | OUTPATIENT
Start: 2020-06-04 | End: 2020-06-05 | Stop reason: HOSPADM

## 2020-06-04 RX ADMIN — IBUPROFEN 600 MG: 600 TABLET ORAL at 18:48

## 2020-06-04 RX ADMIN — DOCUSATE SODIUM 100 MG: 100 CAPSULE, LIQUID FILLED ORAL at 10:41

## 2020-06-04 RX ADMIN — OXYCODONE AND ACETAMINOPHEN 1 TABLET: 5; 325 TABLET ORAL at 02:35

## 2020-06-04 RX ADMIN — IBUPROFEN 600 MG: 600 TABLET ORAL at 02:35

## 2020-06-04 RX ADMIN — OXYCODONE AND ACETAMINOPHEN 1 TABLET: 5; 325 TABLET ORAL at 20:46

## 2020-06-04 RX ADMIN — DOCUSATE SODIUM 100 MG: 100 CAPSULE, LIQUID FILLED ORAL at 18:48

## 2020-06-04 RX ADMIN — OXYCODONE AND ACETAMINOPHEN 1 TABLET: 5; 325 TABLET ORAL at 07:02

## 2020-06-04 RX ADMIN — IBUPROFEN 600 MG: 600 TABLET ORAL at 10:47

## 2020-06-04 RX ADMIN — FERROUS SULFATE TAB 325 MG (65 MG ELEMENTAL FE) 325 MG: 325 (65 FE) TAB at 10:41

## 2020-06-04 RX ADMIN — OXYCODONE AND ACETAMINOPHEN 1 TABLET: 5; 325 TABLET ORAL at 14:35

## 2020-06-04 NOTE — NURSING NOTE
Seen by social work and CPS worker today for formulation of plan of care for diyad upon discharge. See notes.

## 2020-06-04 NOTE — PROGRESS NOTES
Continued Stay Note  [facility]     Patient Name: Yana Dunham  MRN: [id number]  Today's Date: 6/4/2020    Admit Date: 6/3/2020    Discharge Plan     Row Name 06/04/20 1221       Plan    Plan  continued    Plan Comments   …continued…Mother denies issues of domestic violence. Mother states NOBLE is an alcoholic but is not physically aggressive. CSW states that if for any reason she does not wish for him to be in the room she can call CSW or speak to RN. Contact information for CSW given to mother. Mother states father is allowed back in to the room at this time. Mother denies FOB is aggressive with any of her children at home. Mother became tearful speaking of FOB and him embarrassing her and not being a support to her at this time. Mother states desire for DNA testing information due to FOB thinking this baby is not his due to dark hair. CSW brought mother DNA testing information. Mother states she has support at home from mother. Mother plans for infant to be seen by [facility] pediatrician Jacinta Vivas. CSW spoke to mother related to CPS report [id number]. Mother became tearful and stated she did not want her kids  taken away. CSW stated CPS worker should be by today to speak to her. CSW encouraged mother to have questions ready for CPS worker so she would know what the expectations are for infant to discharge home with mother if possible. CSW reiterated that CPS worker will make discharge plans appropriate for infants safety not the CSW. CSW discussed cord toxicology with mother stating the lab will complete confirmation on any positive drugs from cord toxicology. CSW to follow for cord toxicology results and report to CPS worker Zita Hernández [phone]. Harriet Pandya CSW.    Row Name 06/04/20 7348       Plan    Plan  CSW to follow up with CPS for recommendations for discharge. Harriet Pandya    Plan Comments   Mother: Yana Dunham, MRN: [id number]. Infant Shahida Dunham, MRN: [id number]. CSW consult for,  "\"+UDS (amphetamines/methamphetamines 4/20/20 and +THC on admission), inconsistent prenatal care.\" CSW spoke to mother in the room, infant currently in the NICU related to jaundice. Mother began speaking with CSW and father of the baby, Arvi came in the room. Mother gave CSW to speak to mother with father in the room. Mother states current address of [address] [place] [address] is no longer where she lives. She lives with her parents and 5 other children at [address] [place] [address] [place], [place]. FOB lives in [place] with his mother. CSW later spoke to CPS active worker Zita Hernández [phone] and supervisor Daniel Shin to update on address change due to possible transfer of case between CPS department counties. CSW continued speaking with mother in the room and confirmed phone number, emergency contacts, and insurance coverage. CSW explained that [employer] should be calling today to aid in adding infant to passport insurance. CSW went over information for mother including depression identifiers. CSW encouraged father and mother of the baby to go over those together due to infant in the NICU depression risk may increase due to stressors. CSW also discussed transportation with passport, counseling services, [place], and [facility]. Mother states she is not current with [facility] and CSW will provide contact information for [facility]. Mother was agreeable to HANDS referral at this time. CSW began speaking to mother related to positive urine drug screen. FOB began to curse and appear agitated. Mother told FOB to leave the room and he left. Mother states she did use marijuana during a visit to her sister in [place] for a sleep aid and to help with acid reflux. Mother states she has not used any other substances during her pregnancy other than marijuana. Urine drug screen from 4/2/2020 was positive for amphetamines/methamphetamines but was not lab confirmed.  Mother also denies coming to [facility] [place]. She states she did go to [facility] but not to this hospital on that date. Mother confirms prenatal care with [facility]. CSW spoke to mother related to lack of prenatal care. Mother states due to COVID-19 she did not have caregivers for her 3 and 5 year old for her appointments. Mother denies difficulty scheduling appointments or transportation. Mother states she did not have concerns during pregnancy…continued…        Discharge Codes    No documentation.             Harriet Pandya

## 2020-06-04 NOTE — PROGRESS NOTES
Postpartum Progress Note      Status post Vaginal Delivery: Doing well postoperatively.     1) postpartum care immediately following delivery : Doing well, routine care. Infant in NICU so does not desire early discharge.   2) anemia postpartum - start iron - Hg 10.1 grams to 9.1 grams. Routine delivery loss on iron deficiency anemia.     Rh status: O positive  Rubella: non-immune  Gender: Male in NICU (Jaundice)   COVID-19 Negative     Subjective     Postpartum Day 1: Vaginal delivery    The patient feels well. The patient denies emotional concerns. Pain is well controlled with current medications. The baby is well. The patient is ambulating well. The patient is tolerating a normal diet.     Objective     Vital signs in last 24 hours:  Temp:  [97.2 °F (36.2 °C)-98.8 °F (37.1 °C)] 97.6 °F (36.4 °C)  Heart Rate:  [] 90  Resp:  [16-20] 20  BP: ()/(56-91) 103/66      General:    alert, appears stated age and cooperative   Abdomen:  Soft, Non-tender    Lochia:  appropriate   Uterine Fundus:   firm   Ext    Edema 1+   DVT Evaluation:  No evidence of DVT seen on physical exam.     Lab Results   Component Value Date    WBC 12.77 (H) 06/04/2020    HGB 9.1 (L) 06/04/2020    HCT 27.0 (L) 06/04/2020    MCV 79.6 06/04/2020     06/04/2020       Kwan Guevara MD  6/4/2020  09:43

## 2020-06-04 NOTE — PROGRESS NOTES
"Continued Stay Note  [facility]     Patient Name: Yana Dunham  MRN: [id number]  Today's Date: 6/4/2020    Admit Date: 6/3/2020    Discharge Plan     Row Name 06/04/20 1215       Plan    Plan  CSW to follow up with CPS for recommendations for discharge. Harriet Pandya    Plan Comments   Mother: Yana Dunham, MRN: [id number]. Infant Shahida Dunham, MRN: [id number]. CSW consult for, \"+UDS (amphetamines/methamphetamines 4/20/20 and +THC on admission), inconsistent prenatal care.\" CSW spoke to mother in the room, infant currently in the NICU related to jaundice. Mother began speaking with CSW and father of the baby, Ravi came in the room. Mother gave CSW to speak to mother with father in the room. Mother states current address of [address] [place] [address] is no longer where she lives. She lives with her parents and 5 other children at [address] [place] [address] [place] FOB lives in [place] with his mother. CSW later spoke to CPS active worker Zita Hernández [phone] and supervisor Daniel Shin to update on address change due to possible transfer of case between CPS department [facility]. CSW continued speaking with mother in the room and confirmed phone number, emergency contacts, and insurance coverage. CSW explained that [employer] should be calling today to aid in adding infant to passport insurance. CSW went over information for mother including depression identifiers. CSW encouraged father and mother of the baby to go over those together due to infant in the NICU depression risk may increase due to stressors. [facility], counseling services, [place], and [facility]. Mother states she is not current with [facility] and CSW will provide contact information for [facility]. Mother was agreeable to HANDS referral at this time. CSW began speaking to mother related to positive urine drug screen. FOB began to curse and appear agitated. Mother told FOB to leave the " room and he left. Mother states she did use marijuana during a visit to her sister in [place] for a sleep aid and to help with acid reflux. Mother states she has not used any other substances during her pregnancy other than marijuana. Urine drug screen from 4/2/2020 was positive for amphetamines/methamphetamines but was not lab confirmed.  Mother also denies coming to [facility]. She states she did go to [facility] but not to this hospital on that date. Mother confirms prenatal care with [facility]. CSW spoke to mother related to lack of prenatal care. Mother states due to COVID-19 she did not have caregivers for her 3 and 5 year old for her appointments. Mother denies difficulty scheduling appointments or transportation. Mother states she did not have concerns during pregnancy…continued…        Discharge Codes    No documentation.             Harriet Pandya

## 2020-06-04 NOTE — PLAN OF CARE
Problem: Patient Care Overview  Goal: Plan of Care Review  Outcome: Ongoing (interventions implemented as appropriate)  Flowsheets (Taken 2020 by Rachelle Arias RN)  Progress: improving  Plan of Care Reviewed With: patient  Outcome Summary: Vss. Bleeding WNL. Po pain medication given. Voiding freely. Up ad shobha.  Goal: Individualization and Mutuality  Outcome: Ongoing (interventions implemented as appropriate)  Goal: Discharge Needs Assessment  Outcome: Ongoing (interventions implemented as appropriate)  Goal: Interprofessional Rounds/Family Conf  Outcome: Ongoing (interventions implemented as appropriate)     Problem: Fall Risk,  (Adult,Obstetrics,Pediatric)  Goal: Identify Related Risk Factors and Signs and Symptoms  Outcome: Ongoing (interventions implemented as appropriate)  Goal: Absence of Maternal Fall  Outcome: Ongoing (interventions implemented as appropriate)  Goal: Absence of  Fall/Drop  Outcome: Ongoing (interventions implemented as appropriate)     Problem: Skin Injury Risk (Adult)  Goal: Identify Related Risk Factors and Signs and Symptoms  Outcome: Ongoing (interventions implemented as appropriate)  Goal: Skin Health and Integrity  Outcome: Ongoing (interventions implemented as appropriate)     Problem: Anesthesia/Analgesia, Neuraxial (Obstetrics)  Goal: Signs and Symptoms of Listed Potential Problems Will be Absent, Minimized or Managed (Anesthesia/Analgesia, Neuraxial)  Outcome: Ongoing (interventions implemented as appropriate)     Problem: Postpartum (Vaginal Delivery) (Adult,Obstetrics,Pediatric)  Goal: Signs and Symptoms of Listed Potential Problems Will be Absent, Minimized or Managed (Postpartum)  Outcome: Ongoing (interventions implemented as appropriate)

## 2020-06-04 NOTE — PROGRESS NOTES
Continued Stay Note  [facility]     Patient Name: Yana Dunham  MRN: [id number]  Today's Date: 6/4/2020    Admit Date: 6/3/2020    Discharge Plan     Row Name 06/04/20 1648       Plan    Plan  Infant to discharge home with mother and one maternal grandparent for supervision. Harriet Pandya CSW.    Plan Comments  Mother: Yana Dunham, MRN: [id number]. Infant Shahida Dunham, MRN: [id number]. CSW spoke to active worker Kenan at [facility]. Contact information for Kenan is [phone]. CPS worker came to interview mother today at the hospital and see infant in NICU. Per CPS recommendation infant is permitted to discharge home with mother and maternal grandparent with supervision from mothers parents who live with her. A maternal grandparent must be present at time of discharge.Mother and father are permitted to visit infant in NICU at this time.CSW to follow cord toxicology, and discharge date, and report to CPS worker Kenan. Harriet Pandya CSW.          Discharge Codes    No documentation.             Harriet Pandya

## 2020-06-04 NOTE — PLAN OF CARE
Problem: Patient Care Overview  Goal: Plan of Care Review  Outcome: Ongoing (interventions implemented as appropriate)  Flowsheets (Taken 6/4/2020 0317)  Progress: improving  Plan of Care Reviewed With: patient  Outcome Summary: Vss. Bleeding WNL. Po pain medication given. Voiding freely. Up ad shobha.

## 2020-06-05 VITALS
HEART RATE: 99 BPM | BODY MASS INDEX: 37.3 KG/M2 | TEMPERATURE: 98.3 F | DIASTOLIC BLOOD PRESSURE: 71 MMHG | SYSTOLIC BLOOD PRESSURE: 105 MMHG | RESPIRATION RATE: 18 BRPM | WEIGHT: 190 LBS | HEIGHT: 60 IN

## 2020-06-05 PROCEDURE — 25010000002 MEASLES, MUMPS & RUBELLA VAC RECONSTITUTED SOLUTION: Performed by: OBSTETRICS & GYNECOLOGY

## 2020-06-05 PROCEDURE — 99024 POSTOP FOLLOW-UP VISIT: CPT | Performed by: OBSTETRICS & GYNECOLOGY

## 2020-06-05 PROCEDURE — 90471 IMMUNIZATION ADMIN: CPT | Performed by: OBSTETRICS & GYNECOLOGY

## 2020-06-05 PROCEDURE — 90707 MMR VACCINE SC: CPT | Performed by: OBSTETRICS & GYNECOLOGY

## 2020-06-05 RX ORDER — IBUPROFEN 600 MG/1
600 TABLET ORAL EVERY 8 HOURS PRN
Qty: 30 TABLET | Refills: 0 | Status: SHIPPED | OUTPATIENT
Start: 2020-06-05

## 2020-06-05 RX ORDER — OXYCODONE HYDROCHLORIDE AND ACETAMINOPHEN 5; 325 MG/1; MG/1
1 TABLET ORAL EVERY 6 HOURS PRN
Qty: 16 TABLET | Refills: 0 | Status: SHIPPED | OUTPATIENT
Start: 2020-06-05

## 2020-06-05 RX ORDER — PSEUDOEPHEDRINE HCL 30 MG
100 TABLET ORAL 2 TIMES DAILY
Qty: 20 EACH | Refills: 0 | Status: SHIPPED | OUTPATIENT
Start: 2020-06-05

## 2020-06-05 RX ADMIN — OXYCODONE AND ACETAMINOPHEN 1 TABLET: 5; 325 TABLET ORAL at 04:35

## 2020-06-05 RX ADMIN — DOCUSATE SODIUM 100 MG: 100 CAPSULE, LIQUID FILLED ORAL at 09:17

## 2020-06-05 RX ADMIN — FERROUS SULFATE TAB 325 MG (65 MG ELEMENTAL FE) 325 MG: 325 (65 FE) TAB at 09:17

## 2020-06-05 RX ADMIN — IBUPROFEN 600 MG: 600 TABLET ORAL at 15:24

## 2020-06-05 RX ADMIN — MEASLES, MUMPS, AND RUBELLA VIRUS VACCINE LIVE 0.5 ML: 1000; 12500; 1000 INJECTION, POWDER, LYOPHILIZED, FOR SUSPENSION SUBCUTANEOUS at 15:24

## 2020-06-05 RX ADMIN — OXYCODONE AND ACETAMINOPHEN 1 TABLET: 5; 325 TABLET ORAL at 15:24

## 2020-06-05 RX ADMIN — OXYCODONE AND ACETAMINOPHEN 1 TABLET: 5; 325 TABLET ORAL at 09:17

## 2020-06-05 RX ADMIN — IBUPROFEN 600 MG: 600 TABLET ORAL at 04:36

## 2020-06-05 NOTE — DISCHARGE SUMMARY
Date of Discharge:  2020    Discharge Diagnosis: 1.  Intrauterine pregnancy at 37-5/7 weeks, delivered    Presenting Problem/History of Present Illness  Pregnancy [Z34.90]       Hospital Course  Patient is a 34 y.o. female presented at 37-1/2 weeks in active labor.  She progressed to spontaneous vaginal delivery of a vigorous .  For event surrounding delivery, please see the delivery note.  The postpartum course was smooth.  By postpartum day #2, the patient was afebrile, tolerating a regular diet and her lochia was minimal.  At this point, I felt that the patient was stable for discharge and the patient agreed.  Her baby is in the NICU and she would like to board if possible..      Procedures Performed         Consults:   Consults     No orders found from 2020 to 2020.              Condition on Discharge: Stable    Vital Signs  Temp:  [97.4 °F (36.3 °C)-98.3 °F (36.8 °C)] 98.3 °F (36.8 °C)  Heart Rate:  [] 99  Resp:  [18-20] 18  BP: (105-121)/(71-80) 105/71    Physical Exam:   The abdomen is soft and nondistended.  Fundus is firm and nontender.  Extremities are equal in size with 1+ bipedal edema.    Discharge Disposition  Home or Self Care    Discharge Medications     Discharge Medications      New Medications      Instructions Start Date   docusate sodium 100 MG capsule   100 mg, Oral, 2 Times Daily      ibuprofen 600 MG tablet  Commonly known as:  ADVIL,MOTRIN   600 mg, Oral, Every 8 Hours PRN      oxyCODONE-acetaminophen 5-325 MG per tablet  Commonly known as:  PERCOCET   1 tablet, Oral, Every 6 Hours PRN         Continue These Medications      Instructions Start Date   prenatal (CLASSIC) vitamin 28-0.8 MG tablet tablet   Oral, Daily         Stop These Medications    calcium carbonate 500 MG chewable tablet  Commonly known as:  TUMS     famotidine 20 MG tablet  Commonly known as:  PEPCID     ferrous sulfate 325 (65 FE) MG tablet            Discharge Diet:     Activity at Discharge:      Follow-up Appointments  No future appointments.      Test Results Pending at Discharge   Order Current Status    THC (marijuana), Urine, Confirmation - Urine, Clean Catch In process           Servando Rodriguez MD  06/05/20  13:28    Time: Discharge 25 min
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
## 2020-06-05 NOTE — PROGRESS NOTES
Continued Stay Note  [facility]     Patient Name: Yana Dunham  MRN: [id number]  Today's Date: 6/5/2020    Admit Date: 6/3/2020    Discharge Plan     Row Name 06/05/20 1135       Plan    Plan  Per CPS, Infant to discharge home with mother and one maternal grandparent for supervision. CSW will follow for cord toxicology results. ANSON David    Plan Comments  Mother: Yana Dunham, MRN: [id number]. Infant Shahida Dunham, MRN: [id number]. CSW spoke with CPS worker Kenan Stephens and advised of incident on 6/4, where father was removed by security. Appreciate RN documentation of incident. CPS worker Kat advised that plan is still for infant to discharge home with mother and maternal grandparents with maternal grandparents to provide supervision for mother with infant. CSW requested letter from CPS worker Kat on letterhead indicating discharge plan. Await follow up from CPS regarding letter. CSW will continue to follow for cord toxicology results and will report results to CPS. ANSON David        Discharge Codes    No documentation.             DORIS David

## 2020-06-05 NOTE — PLAN OF CARE
Problem: Patient Care Overview  Goal: Plan of Care Review  Outcome: Ongoing (interventions implemented as appropriate)  Flowsheets (Taken 6/5/2020 0301)  Progress: improving  Plan of Care Reviewed With: patient  Outcome Summary: Bleeding wnl. Po pain medication given for pain control. Amb to the nicu. FOB not allowed back on unit for inappropriate behavior. D\c today.

## 2020-06-08 LAB
CANNABINOIDS UR QL CFM: NORMAL
Lab: NORMAL

## 2020-06-08 NOTE — PROGRESS NOTES
Continued Stay Note  [facility]     Patient Name: Yana Dunham  MRN: [id number]  Today's Date: 6/8/2020    Admit Date: 6/3/2020    Discharge Plan     Row Name 06/08/20 1048       Plan    Plan  Infant to discharge home with mother and maternal grandparent. CPS to follow. Harriet Pandya CSW.    Plan Comments  Mother: Yana Dunham, MRN: [id number]. Infant Shahida Dunham, MRN: [id number]. CSW received cord toxicology results which were negative. Cord results were sent to active CPS worker Kenan Stephens at [facility]. CSW to follow for discharge date to report to CPS and follow for any update to discharge plan per CPS recommendation. At this time infant may discharge home with mother and maternal grandparent. Maternal grandparent, Darren or Jim Dunham, must be present at time of discharge when infant is medically ready. Harriet Pandya CSW.        Discharge Codes    No documentation.       Expected Discharge Date and Time     Expected Discharge Date Expected Discharge Time    Jun 5, 2020             Harriet Pandya

## 2020-06-09 NOTE — PROGRESS NOTES
Continued Stay Note  [facility]     Patient Name: Yana Dunham  MRN: [id number]  Today's Date: 6/9/2020    Admit Date: 6/3/2020    Discharge Plan     Row Name 06/09/20 1300       Plan    Plan  Per CPS, infant to discharge home with mother and maternal grandparents, one maternal grandparent must be present at discharge. ANSON David    Plan Comments  Mother: Yana Dunham, MRN: [id number]. Infant Shahida Dunham, MRN: [id number]. CSW spoke with RN RAVINDRA Perez, who reported discharge concerns regarding father transporting mother at discharge. CSW emailed CPS worker Kenan Stephens and informed him of what mother had been telling staff and advised CPS worker that infant is likely to discharge on 6/10. CSW spoke with mother via hospital phone in her boarding room. Mother reports she has spoken with CPS worker Kenan Stephens last week and is aware infant is to be discharged to her and one of her parents and she must go with them at discharge. Mother verified that she is aware of this and understands this. Mother denied any other questions and CSW provided support and encouragement to mother about infant upcoming discharge. No other issues noted at this time.  ANSON David        Discharge Codes    No documentation.       Expected Discharge Date and Time     Expected Discharge Date Expected Discharge Time    Jun 5, 2020             DORIS David

## 2020-06-12 ENCOUNTER — TELEPHONE (OUTPATIENT)
Dept: LACTATION | Facility: HOSPITAL | Age: 35
End: 2020-06-12

## 2020-06-12 NOTE — TELEPHONE ENCOUNTER
D/c follow up call: mother reports still pumping and infant latching, states that every thing is going great, has no questions/concerns at this time.

## 2022-08-23 ENCOUNTER — APPOINTMENT (OUTPATIENT)
Dept: CT IMAGING | Facility: HOSPITAL | Age: 37
End: 2022-08-23

## 2022-08-23 ENCOUNTER — HOSPITAL ENCOUNTER (EMERGENCY)
Facility: HOSPITAL | Age: 37
Discharge: HOME | End: 2022-08-23
Attending: EMERGENCY MEDICINE | Admitting: EMERGENCY MEDICINE

## 2022-08-23 VITALS
BODY MASS INDEX: 30.38 KG/M2 | DIASTOLIC BLOOD PRESSURE: 90 MMHG | SYSTOLIC BLOOD PRESSURE: 121 MMHG | OXYGEN SATURATION: 97 % | TEMPERATURE: 97.7 F | HEART RATE: 114 BPM | RESPIRATION RATE: 18 BRPM | WEIGHT: 154.76 LBS | HEIGHT: 60 IN

## 2022-08-23 DIAGNOSIS — K29.00 ACUTE GASTRITIS WITHOUT HEMORRHAGE, UNSPECIFIED GASTRITIS TYPE: ICD-10-CM

## 2022-08-23 DIAGNOSIS — R10.13 EPIGASTRIC PAIN: Primary | ICD-10-CM

## 2022-08-23 LAB
ALBUMIN SERPL-MCNC: 4.1 G/DL (ref 3.5–5.2)
ALBUMIN/GLOB SERPL: 1.5 G/DL
ALP SERPL-CCNC: 109 U/L (ref 39–117)
ALT SERPL W P-5'-P-CCNC: 40 U/L (ref 1–33)
AMPHET+METHAMPHET UR QL: POSITIVE
ANION GAP SERPL CALCULATED.3IONS-SCNC: 10.3 MMOL/L (ref 5–15)
AST SERPL-CCNC: 110 U/L (ref 1–32)
BACTERIA UR QL AUTO: ABNORMAL /HPF
BARBITURATES UR QL SCN: NEGATIVE
BASOPHILS # BLD AUTO: 0.04 10*3/MM3 (ref 0–0.2)
BASOPHILS NFR BLD AUTO: 0.3 % (ref 0–1.5)
BENZODIAZ UR QL SCN: NEGATIVE
BILIRUB SERPL-MCNC: 0.4 MG/DL (ref 0–1.2)
BILIRUB UR QL STRIP: NEGATIVE
BUN SERPL-MCNC: 15 MG/DL (ref 6–20)
BUN/CREAT SERPL: 20.5 (ref 7–25)
CALCIUM SPEC-SCNC: 9.4 MG/DL (ref 8.6–10.5)
CANNABINOIDS SERPL QL: NEGATIVE
CHLORIDE SERPL-SCNC: 107 MMOL/L (ref 98–107)
CLARITY UR: ABNORMAL
CO2 SERPL-SCNC: 22.7 MMOL/L (ref 22–29)
COCAINE UR QL: NEGATIVE
COLOR UR: ABNORMAL
CREAT SERPL-MCNC: 0.73 MG/DL (ref 0.57–1)
DEPRECATED RDW RBC AUTO: 45.2 FL (ref 37–54)
EGFRCR SERPLBLD CKD-EPI 2021: 108.1 ML/MIN/1.73
EOSINOPHIL # BLD AUTO: 0.15 10*3/MM3 (ref 0–0.4)
EOSINOPHIL NFR BLD AUTO: 1.1 % (ref 0.3–6.2)
ERYTHROCYTE [DISTWIDTH] IN BLOOD BY AUTOMATED COUNT: 13.3 % (ref 12.3–15.4)
GLOBULIN UR ELPH-MCNC: 2.7 GM/DL
GLUCOSE SERPL-MCNC: 113 MG/DL (ref 65–99)
GLUCOSE UR STRIP-MCNC: NEGATIVE MG/DL
HCG INTACT+B SERPL-ACNC: 2.48 MIU/ML
HCT VFR BLD AUTO: 37.8 % (ref 34–46.6)
HGB BLD-MCNC: 12.8 G/DL (ref 12–15.9)
HGB UR QL STRIP.AUTO: ABNORMAL
HOLD SPECIMEN: NORMAL
HOLD SPECIMEN: NORMAL
HYALINE CASTS UR QL AUTO: ABNORMAL /LPF
IMM GRANULOCYTES # BLD AUTO: 0.05 10*3/MM3 (ref 0–0.05)
IMM GRANULOCYTES NFR BLD AUTO: 0.4 % (ref 0–0.5)
KETONES UR QL STRIP: ABNORMAL
LEUKOCYTE ESTERASE UR QL STRIP.AUTO: NEGATIVE
LIPASE SERPL-CCNC: 21 U/L (ref 13–60)
LYMPHOCYTES # BLD AUTO: 1.3 10*3/MM3 (ref 0.7–3.1)
LYMPHOCYTES NFR BLD AUTO: 9.1 % (ref 19.6–45.3)
MCH RBC QN AUTO: 31.4 PG (ref 26.6–33)
MCHC RBC AUTO-ENTMCNC: 33.9 G/DL (ref 31.5–35.7)
MCV RBC AUTO: 92.9 FL (ref 79–97)
METHADONE UR QL SCN: NEGATIVE
MONOCYTES # BLD AUTO: 0.59 10*3/MM3 (ref 0.1–0.9)
MONOCYTES NFR BLD AUTO: 4.1 % (ref 5–12)
NEUTROPHILS NFR BLD AUTO: 12.09 10*3/MM3 (ref 1.7–7)
NEUTROPHILS NFR BLD AUTO: 85 % (ref 42.7–76)
NITRITE UR QL STRIP: NEGATIVE
NRBC BLD AUTO-RTO: 0 /100 WBC (ref 0–0.2)
OPIATES UR QL: POSITIVE
OXYCODONE UR QL SCN: NEGATIVE
PH UR STRIP.AUTO: 5.5 [PH] (ref 5–8)
PLATELET # BLD AUTO: 286 10*3/MM3 (ref 140–450)
PMV BLD AUTO: 8.3 FL (ref 6–12)
POTASSIUM SERPL-SCNC: 4.1 MMOL/L (ref 3.5–5.2)
PROT SERPL-MCNC: 6.8 G/DL (ref 6–8.5)
PROT UR QL STRIP: ABNORMAL
RBC # BLD AUTO: 4.07 10*6/MM3 (ref 3.77–5.28)
RBC # UR STRIP: ABNORMAL /HPF
REF LAB TEST METHOD: ABNORMAL
SODIUM SERPL-SCNC: 140 MMOL/L (ref 136–145)
SP GR UR STRIP: <=1.005 (ref 1–1.03)
SQUAMOUS #/AREA URNS HPF: ABNORMAL /HPF
UROBILINOGEN UR QL STRIP: ABNORMAL
WBC # UR STRIP: ABNORMAL /HPF
WBC NRBC COR # BLD: 14.22 10*3/MM3 (ref 3.4–10.8)
WHOLE BLOOD HOLD COAG: NORMAL
WHOLE BLOOD HOLD SPECIMEN: NORMAL

## 2022-08-23 PROCEDURE — 81001 URINALYSIS AUTO W/SCOPE: CPT | Performed by: EMERGENCY MEDICINE

## 2022-08-23 PROCEDURE — 74177 CT ABD & PELVIS W/CONTRAST: CPT

## 2022-08-23 PROCEDURE — 80053 COMPREHEN METABOLIC PANEL: CPT | Performed by: EMERGENCY MEDICINE

## 2022-08-23 PROCEDURE — 25010000002 HYDROMORPHONE 1 MG/ML SOLUTION: Performed by: EMERGENCY MEDICINE

## 2022-08-23 PROCEDURE — 80307 DRUG TEST PRSMV CHEM ANLYZR: CPT | Performed by: EMERGENCY MEDICINE

## 2022-08-23 PROCEDURE — 96374 THER/PROPH/DIAG INJ IV PUSH: CPT

## 2022-08-23 PROCEDURE — 96375 TX/PRO/DX INJ NEW DRUG ADDON: CPT

## 2022-08-23 PROCEDURE — 99283 EMERGENCY DEPT VISIT LOW MDM: CPT

## 2022-08-23 PROCEDURE — 36415 COLL VENOUS BLD VENIPUNCTURE: CPT | Performed by: EMERGENCY MEDICINE

## 2022-08-23 PROCEDURE — 83690 ASSAY OF LIPASE: CPT | Performed by: EMERGENCY MEDICINE

## 2022-08-23 PROCEDURE — 85025 COMPLETE CBC W/AUTO DIFF WBC: CPT | Performed by: EMERGENCY MEDICINE

## 2022-08-23 PROCEDURE — 0 IOPAMIDOL PER 1 ML: Performed by: EMERGENCY MEDICINE

## 2022-08-23 PROCEDURE — 84702 CHORIONIC GONADOTROPIN TEST: CPT | Performed by: EMERGENCY MEDICINE

## 2022-08-23 PROCEDURE — 25010000002 ONDANSETRON PER 1 MG: Performed by: EMERGENCY MEDICINE

## 2022-08-23 RX ORDER — ONDANSETRON 2 MG/ML
4 INJECTION INTRAMUSCULAR; INTRAVENOUS ONCE
Status: COMPLETED | OUTPATIENT
Start: 2022-08-23 | End: 2022-08-23

## 2022-08-23 RX ORDER — SODIUM CHLORIDE 0.9 % (FLUSH) 0.9 %
10 SYRINGE (ML) INJECTION AS NEEDED
Status: DISCONTINUED | OUTPATIENT
Start: 2022-08-23 | End: 2022-08-23 | Stop reason: HOSPADM

## 2022-08-23 RX ORDER — ESOMEPRAZOLE MAGNESIUM 40 MG/1
40 CAPSULE, DELAYED RELEASE ORAL
Qty: 20 CAPSULE | Refills: 0 | Status: SHIPPED | OUTPATIENT
Start: 2022-08-23

## 2022-08-23 RX ORDER — FAMOTIDINE 20 MG/1
20 TABLET, FILM COATED ORAL 2 TIMES DAILY
Qty: 20 TABLET | Refills: 0 | Status: SHIPPED | OUTPATIENT
Start: 2022-08-23

## 2022-08-23 RX ADMIN — IOPAMIDOL 100 ML: 755 INJECTION, SOLUTION INTRAVENOUS at 15:38

## 2022-08-23 RX ADMIN — HYDROMORPHONE HYDROCHLORIDE 1 MG: 1 INJECTION, SOLUTION INTRAMUSCULAR; INTRAVENOUS; SUBCUTANEOUS at 15:14

## 2022-08-23 RX ADMIN — SODIUM CHLORIDE 1000 ML: 9 INJECTION, SOLUTION INTRAVENOUS at 15:12

## 2022-08-23 RX ADMIN — ONDANSETRON 4 MG: 2 INJECTION INTRAMUSCULAR; INTRAVENOUS at 15:14

## 2022-08-23 NOTE — DISCHARGE INSTRUCTIONS
Do not smoke.  Do not drink alcohol.  Do not use illegal substances.  Avoid caffeine.  Take Mylanta between meals and at bedtime.  Take medications as prescribed.  Return for worsening symptoms.  Follow-up with your doctor in 2 days if no better.

## 2023-01-01 NOTE — TELEPHONE ENCOUNTER
Patient states that she has been having some sharp shooting pains in her stomach.   I told her to monitor over the weekend and if it worsens, to go immediately to LD.  She says there is no blood or loss of fluids, she thinks she has been having some fabiola Palacios but they are very very far apart.   [id number]

## 2024-02-05 ENCOUNTER — HOSPITAL ENCOUNTER (EMERGENCY)
Facility: HOSPITAL | Age: 39
Discharge: LEFT BEFORE DISCHARGE | End: 2024-02-05
Payer: COMMERCIAL

## 2024-02-05 PROCEDURE — 99211 OFF/OP EST MAY X REQ PHY/QHP: CPT

## 2024-04-03 NOTE — PLAN OF CARE
Problem: Patient Care Overview  Goal: Plan of Care Review  6/3/2020 1400 by Shelly Singletary RN  Outcome: Ongoing (interventions implemented as appropriate)  Flowsheets  Taken 6/3/2020 1215  Progress: improving  Taken 6/3/2020 1349  Plan of Care Reviewed With: patient;spouse  Taken 6/3/2020 1400  Outcome Summary:  of viable baby boy. Apgars 8/9. Fundus firm, midline, at the umbilicus with scant amt of bleeding. Pain 0/10. Will transfer to post partum when discharge criteria met     Problem: Patient Care Overview  Goal: Individualization and Mutuality  6/3/2020 1400 by Shelly Singletary, RN  Outcome: Ongoing (interventions implemented as appropriate)  Flowsheets  Taken 6/3/2020 1400  Patient Specific Goals (Include Timeframe): adequate breastfeeding before discharge  Patient Specific Interventions: breastfeeding  Patient Specific Preferences: breastfeeding  Taken 6/3/2020 1206  How to Address Anxieties/Fears: none  What Information Would Help Us Give You More Personalized Care?: none  How Would You and/or Your Support Person Like to Participate in Your Care?: remain at bedside and active in care  What Anxieties, Fears, Concerns, or Questions Do You Have About Your Care?: none     Problem: Patient Care Overview  Goal: Discharge Needs Assessment  6/3/2020 1400 by Shelly Singletary RN  Outcome: Ongoing (interventions implemented as appropriate)  Flowsheets  Taken 6/3/2020 1143  Equipment Needed After Discharge: none  Anticipated Changes Related to Illness: none  Transportation Anticipated: car, drives self  Transportation Concerns: car, none  Concerns to be Addressed: no discharge needs identified  Readmission Within the Last 30 Days: no previous admission in last 30 days  Patient/Family Anticipated Services at Transition: none  Patient/Family Anticipates Transition to: home  Taken 6/3/2020 1144  Equipment Currently Used at Home: none     Problem: Patient Care Overview  Goal: Interprofessional Rounds/Family  Conf  6/3/2020 1400 by Shelly Singletary RN  Outcome: Ongoing (interventions implemented as appropriate)     Problem: Fall Risk,  (Adult,Obstetrics,Pediatric)  Goal: Identify Related Risk Factors and Signs and Symptoms  6/3/2020 1400 by Shelly Singletary RN  Outcome: Ongoing (interventions implemented as appropriate)  Flowsheets (Taken 6/3/2020 1215)  Related Risk Factors (Fall Risk, ): regional anesthesia;pain severe  Signs and Symptoms (Fall Risk, ): presence of fall risk factors     Problem: Fall Risk,  (Adult,Obstetrics,Pediatric)  Goal: Absence of Maternal Fall  6/3/2020 1400 by Shelly Singlteary RN  Outcome: Ongoing (interventions implemented as appropriate)  Flowsheets (Taken 6/3/2020 1215)  Absence of Maternal Fall: making progress toward outcome     Problem: Fall Risk,  (Adult,Obstetrics,Pediatric)  Goal: Absence of [place] Fall/Drop  6/3/2020 1400 by Shelly Singletary RN  Outcome: Ongoing (interventions implemented as appropriate)     Problem: Skin Injury Risk (Adult)  Goal: Identify Related Risk Factors and Signs and Symptoms  6/3/2020 1400 by Shelly Singletary RN  Outcome: Ongoing (interventions implemented as appropriate)  Flowsheets (Taken 6/3/2020 1215)  Related Risk Factors (Skin Injury Risk): mobility impaired;medication     Problem: Skin Injury Risk (Adult)  Goal: Skin Health and Integrity  6/3/2020 1400 by Shelly Singletary RN  Outcome: Ongoing (interventions implemented as appropriate)  Flowsheets (Taken 6/3/2020 1215)  Skin Health and Integrity: making progress toward outcome     Problem: Anesthesia/Analgesia, Neuraxial (Obstetrics)  Goal: Signs and Symptoms of Listed Potential Problems Will be Absent, Minimized or Managed (Anesthesia/Analgesia, Neuraxial)  6/3/2020 1400 by Shelly Singletary RN  Outcome: Ongoing (interventions implemented as appropriate)  Flowsheets (Taken 6/3/2020 1215)  Problems Assessed (Neuraxial Anesthesia/Analgesia, OB): all  Problems Present  (Neuraxial Anesth OB): none     Problem: Labor (Cervical Ripen, Induct, Augment) (Adult,Obstetrics,Pediatric)  Goal: Signs and Symptoms of Listed Potential Problems Will be Absent, Minimized or Managed (Labor)  6/3/2020 1400 by Shelly Singletary, RN  Outcome: Outcome(s) achieved  Flowsheets (Taken 6/3/2020 1144)  Problems Assessed (Labor): all  Problems Present (Labor): none      with patient

## 2025-07-22 ENCOUNTER — HOSPITAL ENCOUNTER (EMERGENCY)
Facility: HOSPITAL | Age: 40
Discharge: HOME | End: 2025-07-22
Attending: EMERGENCY MEDICINE | Admitting: EMERGENCY MEDICINE
Payer: COMMERCIAL

## 2025-07-22 ENCOUNTER — APPOINTMENT (OUTPATIENT)
Dept: CT IMAGING | Facility: HOSPITAL | Age: 40
End: 2025-07-22
Payer: COMMERCIAL

## 2025-07-22 VITALS
TEMPERATURE: 98.8 F | RESPIRATION RATE: 20 BRPM | WEIGHT: 145 LBS | HEART RATE: 88 BPM | OXYGEN SATURATION: 98 % | DIASTOLIC BLOOD PRESSURE: 72 MMHG | SYSTOLIC BLOOD PRESSURE: 115 MMHG | BODY MASS INDEX: 28.47 KG/M2 | HEIGHT: 60 IN

## 2025-07-22 DIAGNOSIS — N83.201 RIGHT OVARIAN CYST: ICD-10-CM

## 2025-07-22 DIAGNOSIS — K92.2 ACUTE LOWER GI BLEEDING: ICD-10-CM

## 2025-07-22 DIAGNOSIS — K64.9 HEMORRHOIDS, UNSPECIFIED HEMORRHOID TYPE: ICD-10-CM

## 2025-07-22 DIAGNOSIS — K59.00 CONSTIPATION, UNSPECIFIED CONSTIPATION TYPE: ICD-10-CM

## 2025-07-22 DIAGNOSIS — A54.9 GONORRHEA: Primary | ICD-10-CM

## 2025-07-22 LAB
ALBUMIN SERPL-MCNC: 3.8 G/DL (ref 3.5–5.2)
ALBUMIN/GLOB SERPL: 1.5 G/DL
ALP SERPL-CCNC: 66 U/L (ref 39–117)
ALT SERPL W P-5'-P-CCNC: 24 U/L (ref 1–33)
ANION GAP SERPL CALCULATED.3IONS-SCNC: 10.1 MMOL/L (ref 5–15)
AST SERPL-CCNC: 18 U/L (ref 1–32)
B-HCG UR QL: NEGATIVE
BACTERIAL VAGINOSIS VAG-IMP: NEGATIVE
BASOPHILS # BLD AUTO: 0.03 10*3/MM3 (ref 0–0.2)
BASOPHILS NFR BLD AUTO: 0.4 % (ref 0–1.5)
BILIRUB SERPL-MCNC: <0.2 MG/DL (ref 0–1.2)
BILIRUB UR QL STRIP: NEGATIVE
BUN SERPL-MCNC: 10.7 MG/DL (ref 6–20)
BUN/CREAT SERPL: 16.5 (ref 7–25)
C TRACH DNA SPEC QL NAA+PROBE: NOT DETECTED
CALCIUM SPEC-SCNC: 9.1 MG/DL (ref 8.6–10.5)
CANDIDA DNA VAG QL NAA+PROBE: NOT DETECTED
CANDIDA DNA VAG QL NAA+PROBE: NOT DETECTED
CHLORIDE SERPL-SCNC: 102 MMOL/L (ref 98–107)
CLARITY UR: CLEAR
CO2 SERPL-SCNC: 24.9 MMOL/L (ref 22–29)
COLOR UR: YELLOW
CREAT SERPL-MCNC: 0.65 MG/DL (ref 0.57–1)
DEPRECATED RDW RBC AUTO: 46.5 FL (ref 37–54)
EGFRCR SERPLBLD CKD-EPI 2021: 113.6 ML/MIN/1.73
EOSINOPHIL # BLD AUTO: 0.17 10*3/MM3 (ref 0–0.4)
EOSINOPHIL NFR BLD AUTO: 2.3 % (ref 0.3–6.2)
ERYTHROCYTE [DISTWIDTH] IN BLOOD BY AUTOMATED COUNT: 13.1 % (ref 12.3–15.4)
GLOBULIN UR ELPH-MCNC: 2.6 GM/DL
GLUCOSE SERPL-MCNC: 86 MG/DL (ref 65–99)
GLUCOSE UR STRIP-MCNC: NEGATIVE MG/DL
HCT VFR BLD AUTO: 38.2 % (ref 34–46.6)
HEMOCCULT STL QL IA: POSITIVE
HGB BLD-MCNC: 12.3 G/DL (ref 12–15.9)
HGB UR QL STRIP.AUTO: NEGATIVE
IMM GRANULOCYTES # BLD AUTO: 0.03 10*3/MM3 (ref 0–0.05)
IMM GRANULOCYTES NFR BLD AUTO: 0.4 % (ref 0–0.5)
KETONES UR QL STRIP: NEGATIVE
LEUKOCYTE ESTERASE UR QL STRIP.AUTO: NEGATIVE
LYMPHOCYTES # BLD AUTO: 3.26 10*3/MM3 (ref 0.7–3.1)
LYMPHOCYTES NFR BLD AUTO: 44.7 % (ref 19.6–45.3)
MCH RBC QN AUTO: 30.9 PG (ref 26.6–33)
MCHC RBC AUTO-ENTMCNC: 32.2 G/DL (ref 31.5–35.7)
MCV RBC AUTO: 96 FL (ref 79–97)
MONOCYTES # BLD AUTO: 0.54 10*3/MM3 (ref 0.1–0.9)
MONOCYTES NFR BLD AUTO: 7.4 % (ref 5–12)
N GONORRHOEA RRNA SPEC QL NAA+PROBE: DETECTED
NEUTROPHILS NFR BLD AUTO: 3.26 10*3/MM3 (ref 1.7–7)
NEUTROPHILS NFR BLD AUTO: 44.8 % (ref 42.7–76)
NITRITE UR QL STRIP: NEGATIVE
NRBC BLD AUTO-RTO: 0 /100 WBC (ref 0–0.2)
PH UR STRIP.AUTO: 7.5 [PH] (ref 5–8)
PLATELET # BLD AUTO: 259 10*3/MM3 (ref 140–450)
PMV BLD AUTO: 8.2 FL (ref 6–12)
POTASSIUM SERPL-SCNC: 4.1 MMOL/L (ref 3.5–5.2)
PROT SERPL-MCNC: 6.4 G/DL (ref 6–8.5)
PROT UR QL STRIP: NEGATIVE
RBC # BLD AUTO: 3.98 10*6/MM3 (ref 3.77–5.28)
SODIUM SERPL-SCNC: 137 MMOL/L (ref 136–145)
SP GR UR STRIP: <=1.005 (ref 1–1.03)
T VAGINALIS DNA VAG QL NAA+PROBE: NOT DETECTED
UROBILINOGEN UR QL STRIP: NORMAL
WBC NRBC COR # BLD AUTO: 7.29 10*3/MM3 (ref 3.4–10.8)

## 2025-07-22 PROCEDURE — 80053 COMPREHEN METABOLIC PANEL: CPT

## 2025-07-22 PROCEDURE — 81515 NFCT DS BV&VAGINITIS DNA ALG: CPT | Performed by: NURSE PRACTITIONER

## 2025-07-22 PROCEDURE — 87491 CHLMYD TRACH DNA AMP PROBE: CPT | Performed by: NURSE PRACTITIONER

## 2025-07-22 PROCEDURE — 25010000002 CEFTRIAXONE PER 250 MG: Performed by: NURSE PRACTITIONER

## 2025-07-22 PROCEDURE — 87591 N.GONORRHOEAE DNA AMP PROB: CPT | Performed by: NURSE PRACTITIONER

## 2025-07-22 PROCEDURE — 96365 THER/PROPH/DIAG IV INF INIT: CPT

## 2025-07-22 PROCEDURE — 81025 URINE PREGNANCY TEST: CPT | Performed by: NURSE PRACTITIONER

## 2025-07-22 PROCEDURE — 25510000001 IOPAMIDOL PER 1 ML: Performed by: EMERGENCY MEDICINE

## 2025-07-22 PROCEDURE — 36415 COLL VENOUS BLD VENIPUNCTURE: CPT

## 2025-07-22 PROCEDURE — 82274 ASSAY TEST FOR BLOOD FECAL: CPT | Performed by: NURSE PRACTITIONER

## 2025-07-22 PROCEDURE — 85025 COMPLETE CBC W/AUTO DIFF WBC: CPT

## 2025-07-22 PROCEDURE — 99285 EMERGENCY DEPT VISIT HI MDM: CPT

## 2025-07-22 PROCEDURE — 74177 CT ABD & PELVIS W/CONTRAST: CPT

## 2025-07-22 PROCEDURE — 81003 URINALYSIS AUTO W/O SCOPE: CPT | Performed by: NURSE PRACTITIONER

## 2025-07-22 RX ORDER — IOPAMIDOL 755 MG/ML
100 INJECTION, SOLUTION INTRAVASCULAR
Status: COMPLETED | OUTPATIENT
Start: 2025-07-22 | End: 2025-07-22

## 2025-07-22 RX ORDER — POLYETHYLENE GLYCOL 3350 17 G/17G
17 POWDER, FOR SOLUTION ORAL DAILY
Qty: 238 G | Refills: 0 | Status: SHIPPED | OUTPATIENT
Start: 2025-07-22 | End: 2025-07-22

## 2025-07-22 RX ORDER — POLYETHYLENE GLYCOL 3350 17 G/17G
17 POWDER, FOR SOLUTION ORAL DAILY
Qty: 238 G | Refills: 0 | Status: SHIPPED | OUTPATIENT
Start: 2025-07-22

## 2025-07-22 RX ADMIN — IOPAMIDOL 100 ML: 755 INJECTION, SOLUTION INTRAVENOUS at 21:28

## 2025-07-22 RX ADMIN — CEFTRIAXONE SODIUM 1000 MG: 1 INJECTION, POWDER, FOR SOLUTION INTRAMUSCULAR; INTRAVENOUS at 22:01

## 2025-07-22 NOTE — ED PROVIDER NOTES
"Time: 6:59 PM EDT  Date of encounter:  7/22/2025  Independent Historian/Clinical History and Information was obtained by:   Patient    History is limited by: N/A    Chief Complaint: GI problem      History of Present Illness:  Patient is a 39 y.o. year old female who presents to the emergency department for evaluation of \"GI problem\".  Patient is having sharp stabbing pain within her rectum.  She has known hemorrhoids and she knows what that feels like but this feels different.  She is having intermittent episodes of blood in her stool sometimes when she wipes sometimes in the toilet sometimes bright red sometimes dark.  She has never had more \"internal pain\" no fever.  No nausea vomiting diarrhea.  Patient states at times she feels pressure in her low abdomen.  Patient is also concerned and would like to be tested for STDs because she had some intermittent vaginal discharge and has not had her annual physical exam      Patient Care Team  Primary Care Provider: Provider, No Known    Past Medical History:     No Known Allergies  Past Medical History:   Diagnosis Date    Abnormal Pap smear of cervix     current WNL     Past Surgical History:   Procedure Laterality Date    ADENOIDECTOMY      CERVICAL BIOPSY  W/ LOOP ELECTRODE EXCISION      CRYOTHERAPY      TONSILLECTOMY      WRIST SURGERY Bilateral      Family History   Problem Relation Age of Onset    Hypertension Father     Asthma Son     Other Son     Breast cancer Neg Hx     Ovarian cancer Neg Hx     Uterine cancer Neg Hx     Colon cancer Neg Hx        Home Medications:  Prior to Admission medications    Medication Sig Start Date End Date Taking? Authorizing Provider   docusate sodium 100 MG capsule Take 1 capsule by mouth 2 (Two) Times a Day. 6/5/20   Servando Rodriguez MD   esomeprazole (nexIUM) 40 MG capsule Take 1 capsule by mouth Every Morning Before Breakfast. 8/23/22   Elian Nicolas DO   famotidine (PEPCID) 20 MG tablet Take 1 tablet by mouth 2 (Two) Times " "a Day. 8/23/22   Elian Nicolas,    ibuprofen (ADVIL,MOTRIN) 600 MG tablet Take 1 tablet by mouth Every 8 (Eight) Hours As Needed for Mild Pain . 6/5/20   Servando Rodriguez MD   oxyCODONE-acetaminophen (PERCOCET) 5-325 MG per tablet Take 1 tablet by mouth Every 6 (Six) Hours As Needed for Moderate Pain . 6/5/20   Servando Rodriguez MD   Prenatal Vit-Fe Fumarate-FA (PRENATAL, CLASSIC, VITAMIN) 28-0.8 MG tablet tablet Take  by mouth daily.    Provider, Janet, MD        Social History:   Social History     Tobacco Use    Smoking status: Light Smoker     Current packs/day: 0.25     Types: Cigarettes    Smokeless tobacco: Never   Substance Use Topics    Alcohol use: No    Drug use: No     Comment: test positive for amphetimines         Review of Systems:  Review of Systems   Constitutional:  Negative for chills and fever.   HENT:  Negative for congestion, ear pain and sore throat.    Eyes:  Negative for pain.   Respiratory:  Negative for cough, chest tightness and shortness of breath.    Cardiovascular:  Negative for chest pain.   Gastrointestinal:  Positive for abdominal pain, anal bleeding, blood in stool and rectal pain. Negative for diarrhea, nausea and vomiting.   Genitourinary:  Positive for vaginal discharge. Negative for flank pain, hematuria and vaginal bleeding.   Musculoskeletal:  Negative for joint swelling.   Skin:  Negative for pallor.   Neurological:  Negative for seizures and headaches.   Hematological: Negative.    Psychiatric/Behavioral: Negative.     All other systems reviewed and are negative.       Physical Exam:  /69   Pulse 87   Temp 98.8 °F (37.1 °C) (Oral)   Resp 18   Ht 152.4 cm (60\")   Wt 65.8 kg (145 lb)   LMP 07/03/2025 (Approximate)   SpO2 100%   BMI 28.32 kg/m²     Physical Exam  Vitals and nursing note reviewed.   Constitutional:       General: She is not in acute distress.     Appearance: Normal appearance. She is not toxic-appearing.   HENT:      Head: Normocephalic " and atraumatic.      Mouth/Throat:      Mouth: Mucous membranes are moist.   Eyes:      General: No scleral icterus.     Conjunctiva/sclera: Conjunctivae normal.   Cardiovascular:      Rate and Rhythm: Normal rate and regular rhythm.      Pulses: Normal pulses.      Heart sounds: Normal heart sounds.   Pulmonary:      Effort: Pulmonary effort is normal. No respiratory distress.      Breath sounds: Normal breath sounds.   Abdominal:      General: Bowel sounds are normal.      Palpations: Abdomen is soft.      Tenderness: There is no abdominal tenderness. There is no right CVA tenderness or left CVA tenderness.   Genitourinary:     General: Normal vulva.      Comments: Patient has  uninflamed or thrombosed external hemorrhoid at the 3:00 location patient does not have significant pain with palpation of the hemorrhoid but does have pain with digital rectal exam more internally.  There is no palpable induration or abscess noted.  There is no cellulitis or erythema perianally    No anal fissure  Musculoskeletal:         General: Normal range of motion.      Cervical back: Normal range of motion and neck supple.   Skin:     General: Skin is warm and dry.   Neurological:      Mental Status: She is alert and oriented to person, place, and time.   Psychiatric:         Mood and Affect: Mood normal.         Behavior: Behavior normal.                  Medical Decision Making:      Comorbidities that affect care:    Cervical dysplasia, hemorrhoids    External Notes reviewed:    Previous Clinic Note: Patient's last visit was with orthopedic surgery back in February for right radial fracture      The following orders were placed and all results were independently analyzed by me:  Orders Placed This Encounter   Procedures    MVP Vaginosis Panel - Swab, Vagina    CT/NG, TV, PCR - Swab, Cervix    Chlamydia trachomatis, Neisseria gonorrhoeae, PCR - Swab, Cervix    CT Abdomen Pelvis With Contrast    Comprehensive Metabolic Panel    CBC  Auto Differential    Occult Blood, Fecal By Immunoassay - Stool, Per Rectum    Urinalysis With Microscopic If Indicated (No Culture) - Urine, Clean Catch    Pregnancy, Urine - Urine, Clean Catch    Ambulatory Referral to Gastroenterology    CBC & Differential       Medications Given in the Emergency Department:  Medications   cefTRIAXone (ROCEPHIN) 1,000 mg in sodium chloride 0.9 % 100 mL IVPB-VTB (has no administration in time range)   iopamidol (ISOVUE-370) 76 % injection 100 mL (100 mL Intravenous Given 7/22/25 2128)        ED Course:    ED Course as of 07/22/25 2159 Tue Jul 22, 2025 2148 CT Abdomen Pelvis With Contrast  Constipation, right ovarian cyst [DS]      ED Course User Index  [DS] Isis Roberts APRN       Labs:    Lab Results (last 24 hours)       Procedure Component Value Units Date/Time    CBC & Differential [[id number]]  (Abnormal) Collected: 07/22/25 1824    Specimen: Blood Updated: 07/22/25 1831    Narrative:      The following orders were created for panel order CBC & Differential.  Procedure                               Abnormality         Status                     ---------                               -----------         ------                     CBC Auto Differential[[id number]]        Abnormal            Final result                 Please view results for these tests on the individual orders.    Comprehensive Metabolic Panel [[id number]] Collected: 07/22/25 1824    Specimen: Blood Updated: 07/22/25 1847     Glucose 86 mg/dL      BUN 10.7 mg/dL      Creatinine 0.65 mg/dL      Sodium 137 mmol/L      Potassium 4.1 mmol/L      Chloride 102 mmol/L      CO2 24.9 mmol/L      Calcium 9.1 mg/dL      Total Protein 6.4 g/dL      Albumin 3.8 g/dL      ALT (SGPT) 24 U/L      AST (SGOT) 18 U/L      Alkaline Phosphatase 66 U/L      Total Bilirubin <0.2 mg/dL      Globulin 2.6 gm/dL      A/G Ratio 1.5 g/dL      BUN/Creatinine Ratio 16.5     Anion Gap 10.1 mmol/L      eGFR 113.6 mL/min/1.73      Narrative:      GFR Categories in Chronic Kidney Disease (CKD)              GFR Category          GFR (mL/min/1.73)    Interpretation  G1                    90 or greater        Normal or high (1)  G2                    60-89                Mild decrease (1)  G3a                   45-59                Mild to moderate decrease  G3b                   30-44                Moderate to severe decrease  G4                    15-29                Severe decrease  G5                    14 or less           Kidney failure    (1)In the absence of evidence of kidney disease, neither GFR category G1 or G2 fulfill the criteria for CKD.    eGFR calculation 2021 CKD-EPI creatinine equation, which does not include race as a factor    CBC Auto Differential [[id number]]  (Abnormal) Collected: 07/22/25 1824    Specimen: Blood Updated: 07/22/25 1831     WBC 7.29 10*3/mm3      RBC 3.98 10*6/mm3      Hemoglobin 12.3 g/dL      Hematocrit 38.2 %      MCV 96.0 fL      MCH 30.9 pg      MCHC 32.2 g/dL      RDW 13.1 %      RDW-SD 46.5 fl      MPV 8.2 fL      Platelets 259 10*3/mm3      Neutrophil % 44.8 %      Lymphocyte % 44.7 %      Monocyte % 7.4 %      Eosinophil % 2.3 %      Basophil % 0.4 %      Immature Grans % 0.4 %      Neutrophils, Absolute 3.26 10*3/mm3      Lymphocytes, Absolute 3.26 10*3/mm3      Monocytes, Absolute 0.54 10*3/mm3      Eosinophils, Absolute 0.17 10*3/mm3      Basophils, Absolute 0.03 10*3/mm3      Immature Grans, Absolute 0.03 10*3/mm3      nRBC 0.0 /100 WBC     Occult Blood, Fecal By Immunoassay - Stool, Per Rectum [[id number]]  (Abnormal) Collected: 07/22/25 1927    Specimen: Stool from Per Rectum Updated: 07/22/25 1939     Occult Blood, Fecal by Immunoassay Positive    Urinalysis With Microscopic If Indicated (No Culture) - Urine, Clean Catch [[id number]]  (Normal) Collected: 07/22/25 1928    Specimen: Urine, Clean Catch Updated: 07/22/25 1940     Color, UA Yellow     Appearance, UA Clear     pH, UA 7.5      Specific Gravity, UA <=1.005     Glucose, UA Negative     Ketones, UA Negative     Bilirubin, UA Negative     Blood, UA Negative     Protein, UA Negative     Leuk Esterase, UA Negative     Nitrite, UA Negative     Urobilinogen, UA 0.2 E.U./dL    Narrative:      Urine microscopic not indicated.    MVP Vaginosis Panel - Swab, Vagina [[id number]]  (Normal) Collected: 07/22/25 1928    Specimen: Swab from Vagina Updated: 07/22/25 2044     Bacterial vaginosis Negative     Candida glabrata/krusei Not Detected     CANDIDA GROUP Not Detected     Comment: Candida group contains one or more of the following: C. albicans, C. tropicalis, C. parapsilosis or C. dubliniensis.        Trichomonas vaginalis Not Detected    CT/NG, TV, PCR - Swab, Cervix [[id number]]  (Abnormal) Collected: 07/22/25 1928    Specimen: Swab from Cervix Updated: 07/22/25 2116    Narrative:      The following orders were created for panel order CT/NG, TV, PCR - Swab, Cervix.  Procedure                               Abnormality         Status                     ---------                               -----------         ------                     Chlamydia trachomatis, N...[[id number]]  Abnormal            Final result               Trichomonas vaginalis, P...[[id number]]                                                   Please view results for these tests on the individual orders.    Chlamydia trachomatis, Neisseria gonorrhoeae, PCR - Swab, Cervix [[id number]]  (Abnormal) Collected: 07/22/25 1928    Specimen: Swab from Cervix Updated: 07/22/25 2116     Chlamydia by PCR Not Detected     Neisseria gonorrhoeae by PCR Detected    Pregnancy, Urine - Urine, Clean Catch [[id number]]  (Normal) Collected: 07/22/25 1928    Specimen: Urine, Clean Catch Updated: 07/22/25 1957     HCG, Urine QL Negative    Narrative:      Sensitive immunoassays may demonstrate false positive results  with specimens containing heterophilic antibodies. Assays may  also exhibit false-positive or  false-negative results with  specimens containing human anti-mouse antibodies. These   specimens may come from patients receving preparations of  mouse monoclonal antibodies for diagnosis or therapy or having  been exposed to mice. If the qualitative interpretation is  inconsistent with the clinical evaluation, results should be   confirmed by an alternate hCG method, ie. quantitative hCG.  As with all urine pregnancy test, this test does not reliably  detect hCG degradation products, including free-beta hCG and  beta core fragments.             Imaging:    CT Abdomen Pelvis With Contrast  Result Date: 7/22/2025  CT ABDOMEN PELVIS W CONTRAST Date of Exam: 7/22/2025 9:24 PM EDT Indication: rectal pain and bleeding/ ab pain. Comparison: 8/23/2022 Technique: Axial CT images were obtained of the abdomen and pelvis after the uneventful intravenous administration of iodinated contrast. Reconstructed coronal and sagittal images were also obtained. Automated exposure control and iterative construction methods were used. Findings: Lung bases are clear. Aorta is normal. Gallbladder is normal. No biliary obstruction. Solid abdominal organs are normal. The kidneys are nonobstructed. Urinary bladder is normal. There is a 1.8 cm right ovarian cyst. Solid pelvic organs are otherwise unremarkable. The appendix is normal. No evidence of acute colitis. Moderate stool burden in the colon may reflect constipation. The rectum is unremarkable. There is stasis in distal ileal loops in the pelvis, and there are multiple gas-filled small bowel loops in the  abdomen and pelvis suggesting an ileus. No bowel obstruction is seen. The stomach is normal. No adenopathy or free fluid is identified.     1.Moderate stool burden in the colon may reflect constipation. Normal appendix and rectum. 2.There is stasis in distal ileal loops in the pelvis, and multiple gas-filled small bowel loops in the abdomen and pelvis suggesting an ileus. No bowel  obstruction is seen. 3.1.8 cm right ovarian cyst. Electronically Signed: Aditya Casas MD  7/22/2025 9:41 PM EDT  Workstation ID: [id number]        Differential Diagnosis and Discussion:    GI Bleeding: Differential diagnosis includes but is not limited to gastritis, gastric ulcer, stress ulcer, duodenitis, Enedelia-Jaime tears, esophageal varices, angiodysplasia, aortic enteric fistula, hematologic issues including thrombocytopenia, GI neoplasm, ulcerative colitis, Crohn's disease, diverticulosis, diverticulitis, hemorrhoids, aortic aneurysm, and polyps  Rectal pain: Differential diagnosis includes but is not limited to anal fissure, hemorrhoids, proctitis, perianal abscess, rectal prolapse, anal fistula, inflammatory bowel disease, STIs, pelvic floor dysfunction, rectal ulcer, rectal foreign body, and infection.    PROCEDURES:    Labs were collected in the emergency department and all labs were reviewed and interpreted by me.  CT scan was performed in the emergency department and the CT scan radiology impression was interpreted by me.    No orders to display       Procedures    MDM  Number of Diagnoses or Management Options  Acute lower GI bleeding  Constipation, unspecified constipation type  Gonorrhea  Hemorrhoids, unspecified hemorrhoid type  Right ovarian cyst  Diagnosis management comments: The patient is resting comfortably and feels better, is alert and in no distress. Repeat examination is unremarkable and benign; in particular, there's no discomfort at McBurney's point and there is no pulsatile mass. The history, exam, diagnostic testing, and current condition does not suggest acute appendicitis, bowel obstruction, acute cholecystitis, bowel perforation, major gastrointestinal bleeding, severe diverticulitis, abdominal aortic aneurysm, mesenteric ischemia, volvulus, sepsis, or other significant pathology that warrants further testing, continued ED treatment, admission, for surgical evaluation at this  point. The vital signs have been stable. The patient does not have uncontrollable pain, intractable vomiting, or other significant symptoms. The patient's condition is stable and appropriate for discharge from the emergency department.       Amount and/or Complexity of Data Reviewed  Clinical lab tests: reviewed and ordered  Tests in the radiology section of CPT®: reviewed and ordered  Tests in the medicine section of CPT®: reviewed and ordered    Risk of Complications, Morbidity, and/or Mortality  Presenting problems: moderate  Diagnostic procedures: moderate  Management options: low    Patient Progress  Patient progress: stable             Patient Care Considerations:    ANTIBIOTICS: I considered prescribing antibiotics as an outpatient however no bacterial focus of infection was found.      Consultants/Shared Management Plan:    SHARED VISIT: I have discussed the case with my supervising physician, dr hart who statesok to dc home with followup. The substantive portion of the medical decision was made by the attesting physician who made or approve the management plan and will take responsibility for the patient.  Clinical findings were discussed and ultimate disposition was made in consult with supervising physician.    Social Determinants of Health:    Patient is independent, reliable, and has access to care.       Disposition and Care Coordination:    Discharged: I considered escalation of care by admitting this patient to the hospital, however CT did not show any surgical emergencies and lab work was unremarkable    I have explained the patient´s condition, diagnoses and treatment plan based on the information available to me at this time. I have answered questions and addressed any concerns. The patient has a good  understanding of the patient´s diagnosis, condition, and treatment plan as can be expected at this point. The vital signs have been stable. The patient´s condition is stable and appropriate for  discharge from the emergency department.      The patient will pursue further outpatient evaluation with the primary care physician or other designated or consulting physician as outlined in the discharge instructions. They are agreeable to this plan of care and follow-up instructions have been explained in detail. The patient has received these instructions in written format and has expressed an understanding of the discharge instructions. The patient is aware that any significant change in condition or worsening of symptoms should prompt an immediate return to this or the closest emergency department or call to 911.  I have explained discharge medications and the need for follow up with the patient/caretakers. This was also printed in the discharge instructions. Patient was discharged with the following medications and follow up:      Medication List        New Prescriptions      polyethylene glycol 17 GM/SCOOP powder  Commonly known as: MIRALAX  Take 17 g by mouth Daily.               Where to Get Your Medications        These medications were sent to [id number] - [place], [place] - [address] AT [facility] [address] [facility] - [phone]  - [phone] FX  [address] , [facility] [address]      Phone: [phone]   polyethylene glycol 17 GM/SCOOP powder      your obgyn      For reevaluation of gonorrhea, monitoring of your ovarian cyst, and annual Pap smear    [facility]  [address] [place] [address] [phone]           Final diagnoses:   Gonorrhea   Acute lower GI bleeding   Hemorrhoids, unspecified hemorrhoid type   Constipation, unspecified constipation type   Right ovarian cyst        ED Disposition       ED Disposition   Discharge    Condition   Stable    Comment   --               This medical record created using voice recognition software.             Isis Roberts, APRN  07/22/25 5271

## 2025-07-22 NOTE — Clinical Note
[facility] EMERGENCY ROOM  [address] [place] [address]  Phone: [phone]  Fax: [phone]    Yana Dunham was seen and treated in our emergency department on 7/22/2025.  She may return to work on 07/24/2025.         Thank you for choosing [facility].    Isis Roberts APRN

## 2025-07-22 NOTE — Clinical Note
[facility] EMERGENCY ROOM  [address] [place] [address]  Phone: [phone]  Fax: [phone]    Yana Dunham was seen and treated in our emergency department on 7/22/2025.  She may return to work on 07/24/2025.         Thank you for choosing [facility].    Isis Roberts APRN

## 2025-07-23 ENCOUNTER — TELEPHONE (OUTPATIENT)
Dept: GASTROENTEROLOGY | Facility: CLINIC | Age: 40
End: 2025-07-23
Payer: COMMERCIAL

## 2025-07-23 NOTE — ED NOTES
Patient a&ox4 and agreeable to discharge. Patient education provided and follow up encouraged. One rx faxed to pharmacy. Skin pwd, rr even and unlabored, no s/sx of distress prior to discharge. Patient ambulatory to exit.

## 2025-07-23 NOTE — TELEPHONE ENCOUNTER
Attempted to contact patient regarding a referral we received. Some answer the phone stated she was in rehab, unsure which one. Deferring for one day

## 2025-07-23 NOTE — ED PROVIDER NOTES
"SHARED VISIT ATTESTATION:    This visit was performed by myself and an APC.  I personally approved the management plan/medical decision making and take responsibility for the patient management.      SHARED VISIT NOTE:    Patient is 39 y.o. year old female that presents to the ED for evaluation of rectal pain.     Physical Exam    ED Course:    /69   Pulse 87   Temp 98.8 °F (37.1 °C) (Oral)   Resp 18   Ht 152.4 cm (60\")   Wt 65.8 kg (145 lb)   LMP 07/03/2025 (Approximate)   SpO2 100%   BMI 28.32 kg/m²       The following orders were placed and all results were independently analyzed by me:  Orders Placed This Encounter   Procedures    MVP Vaginosis Panel - Swab, Vagina    CT/NG, TV, PCR - Swab, Cervix    Chlamydia trachomatis, Neisseria gonorrhoeae, PCR - Swab, Cervix    CT Abdomen Pelvis With Contrast    Comprehensive Metabolic Panel    CBC Auto Differential    Occult Blood, Fecal By Immunoassay - Stool, Per Rectum    Urinalysis With Microscopic If Indicated (No Culture) - Urine, Clean Catch    Pregnancy, Urine - Urine, Clean Catch    Ambulatory Referral to Gastroenterology    CBC & Differential       Medications Given in the Emergency Department:  Medications   cefTRIAXone (ROCEPHIN) 1,000 mg in sodium chloride 0.9 % 100 mL IVPB-VTB (has no administration in time range)   iopamidol (ISOVUE-370) 76 % injection 100 mL (100 mL Intravenous Given 7/22/25 2128)        ED Course:    ED Course as of 07/22/25 2153   Tue Jul 22, 2025 2148 CT Abdomen Pelvis With Contrast  Constipation, right ovarian cyst [DS]      ED Course User Index  [DS] Isis Roberts APRN       Labs:    Lab Results (last 24 hours)       Procedure Component Value Units Date/Time    CBC & Differential [[id number]]  (Abnormal) Collected: 07/22/25 1824    Specimen: Blood Updated: 07/22/25 1831    Narrative:      The following orders were created for panel order CBC & Differential.  Procedure                               Abnormality         " Status                     ---------                               -----------         ------                     CBC Auto Differential[[id number]]        Abnormal            Final result                 Please view results for these tests on the individual orders.    Comprehensive Metabolic Panel [[id number]] Collected: 07/22/25 1824    Specimen: Blood Updated: 07/22/25 1847     Glucose 86 mg/dL      BUN 10.7 mg/dL      Creatinine 0.65 mg/dL      Sodium 137 mmol/L      Potassium 4.1 mmol/L      Chloride 102 mmol/L      CO2 24.9 mmol/L      Calcium 9.1 mg/dL      Total Protein 6.4 g/dL      Albumin 3.8 g/dL      ALT (SGPT) 24 U/L      AST (SGOT) 18 U/L      Alkaline Phosphatase 66 U/L      Total Bilirubin <0.2 mg/dL      Globulin 2.6 gm/dL      A/G Ratio 1.5 g/dL      BUN/Creatinine Ratio 16.5     Anion Gap 10.1 mmol/L      eGFR 113.6 mL/min/1.73     Narrative:      GFR Categories in Chronic Kidney Disease (CKD)              GFR Category          GFR (mL/min/1.73)    Interpretation  G1                    90 or greater        Normal or high (1)  G2                    60-89                Mild decrease (1)  G3a                   45-59                Mild to moderate decrease  G3b                   30-44                Moderate to severe decrease  G4                    15-29                Severe decrease  G5                    14 or less           Kidney failure    (1)In the absence of evidence of kidney disease, neither GFR category G1 or G2 fulfill the criteria for CKD.    eGFR calculation 2021 CKD-EPI creatinine equation, which does not include race as a factor    CBC Auto Differential [[id number]]  (Abnormal) Collected: 07/22/25 1824    Specimen: Blood Updated: 07/22/25 1831     WBC 7.29 10*3/mm3      RBC 3.98 10*6/mm3      Hemoglobin 12.3 g/dL      Hematocrit 38.2 %      MCV 96.0 fL      MCH 30.9 pg      MCHC 32.2 g/dL      RDW 13.1 %      RDW-SD 46.5 fl      MPV 8.2 fL      Platelets 259 10*3/mm3      Neutrophil %  44.8 %      Lymphocyte % 44.7 %      Monocyte % 7.4 %      Eosinophil % 2.3 %      Basophil % 0.4 %      Immature Grans % 0.4 %      Neutrophils, Absolute 3.26 10*3/mm3      Lymphocytes, Absolute 3.26 10*3/mm3      Monocytes, Absolute 0.54 10*3/mm3      Eosinophils, Absolute 0.17 10*3/mm3      Basophils, Absolute 0.03 10*3/mm3      Immature Grans, Absolute 0.03 10*3/mm3      nRBC 0.0 /100 WBC     Occult Blood, Fecal By Immunoassay - Stool, Per Rectum [[id number]]  (Abnormal) Collected: 07/22/25 1927    Specimen: Stool from Per Rectum Updated: 07/22/25 1939     Occult Blood, Fecal by Immunoassay Positive    Urinalysis With Microscopic If Indicated (No Culture) - Urine, Clean Catch [[id number]]  (Normal) Collected: 07/22/25 1928    Specimen: Urine, Clean Catch Updated: 07/22/25 1940     Color, UA Yellow     Appearance, UA Clear     pH, UA 7.5     Specific Gravity, UA <=1.005     Glucose, UA Negative     Ketones, UA Negative     Bilirubin, UA Negative     Blood, UA Negative     Protein, UA Negative     Leuk Esterase, UA Negative     Nitrite, UA Negative     Urobilinogen, UA 0.2 E.U./dL    Narrative:      Urine microscopic not indicated.    MVP Vaginosis Panel - Swab, Vagina [[id number]]  (Normal) Collected: 07/22/25 1928    Specimen: Swab from Vagina Updated: 07/22/25 2044     Bacterial vaginosis Negative     Candida glabrata/krusei Not Detected     CANDIDA GROUP Not Detected     Comment: Candida group contains one or more of the following: C. albicans, C. tropicalis, C. parapsilosis or C. dubliniensis.        Trichomonas vaginalis Not Detected    CT/NG, TV, PCR - Swab, Cervix [[id number]]  (Abnormal) Collected: 07/22/25 1928    Specimen: Swab from Cervix Updated: 07/22/25 2116    Narrative:      The following orders were created for panel order CT/NG, TV, PCR - Swab, Cervix.  Procedure                               Abnormality         Status                     ---------                               -----------          ------                     Chlamydia trachomatis, N...[[id number]]  Abnormal            Final result               Trichomonas vaginalis, P...[[id number]]                                                   Please view results for these tests on the individual orders.    Chlamydia trachomatis, Neisseria gonorrhoeae, PCR - Swab, Cervix [[id number]]  (Abnormal) Collected: 07/22/25 1928    Specimen: Swab from Cervix Updated: 07/22/25 2116     Chlamydia by PCR Not Detected     Neisseria gonorrhoeae by PCR Detected    Pregnancy, Urine - Urine, Clean Catch [[id number]]  (Normal) Collected: 07/22/25 1928    Specimen: Urine, Clean Catch Updated: 07/22/25 1957     HCG, Urine QL Negative    Narrative:      Sensitive immunoassays may demonstrate false positive results  with specimens containing heterophilic antibodies. Assays may  also exhibit false-positive or false-negative results with  specimens containing human anti-mouse antibodies. These   specimens may come from patients receving preparations of  mouse monoclonal antibodies for diagnosis or therapy or having  been exposed to mice. If the qualitative interpretation is  inconsistent with the clinical evaluation, results should be   confirmed by an alternate hCG method, ie. quantitative hCG.  As with all urine pregnancy test, this test does not reliably  detect hCG degradation products, including free-beta hCG and  beta core fragments.             Imaging:    CT Abdomen Pelvis With Contrast  Result Date: 7/22/2025  CT ABDOMEN PELVIS W CONTRAST Date of Exam: 7/22/2025 9:24 PM EDT Indication: rectal pain and bleeding/ ab pain. Comparison: 8/23/2022 Technique: Axial CT images were obtained of the abdomen and pelvis after the uneventful intravenous administration of iodinated contrast. Reconstructed coronal and sagittal images were also obtained. Automated exposure control and iterative construction methods were used. Findings: Lung bases are clear. Aorta is normal. Gallbladder is  normal. No biliary obstruction. Solid abdominal organs are normal. The kidneys are nonobstructed. Urinary bladder is normal. There is a 1.8 cm right ovarian cyst. Solid pelvic organs are otherwise unremarkable. The appendix is normal. No evidence of acute colitis. Moderate stool burden in the colon may reflect constipation. The rectum is unremarkable. There is stasis in distal ileal loops in the pelvis, and there are multiple gas-filled small bowel loops in the  abdomen and pelvis suggesting an ileus. No bowel obstruction is seen. The stomach is normal. No adenopathy or free fluid is identified.     1.Moderate stool burden in the colon may reflect constipation. Normal appendix and rectum. 2.There is stasis in distal ileal loops in the pelvis, and multiple gas-filled small bowel loops in the abdomen and pelvis suggesting an ileus. No bowel obstruction is seen. 3.1.8 cm right ovarian cyst. Electronically Signed: Aditya Casas MD  7/22/2025 9:41 PM EDT  Workstation ID: [id number]      MDM:    Procedures    Labs were collected in the emergency department and all labs were reviewed and interpreted by me.                     Freeman Davila DO  21:53 EDT  07/22/25         Freeman Davila DO  07/22/25 8191

## 2025-07-27 ENCOUNTER — APPOINTMENT (OUTPATIENT)
Dept: CT IMAGING | Facility: HOSPITAL | Age: 40
End: 2025-07-27
Payer: COMMERCIAL

## 2025-07-27 ENCOUNTER — HOSPITAL ENCOUNTER (EMERGENCY)
Facility: HOSPITAL | Age: 40
Discharge: HOME | End: 2025-07-27
Attending: EMERGENCY MEDICINE | Admitting: EMERGENCY MEDICINE
Payer: COMMERCIAL

## 2025-07-27 VITALS
OXYGEN SATURATION: 97 % | WEIGHT: 156.09 LBS | HEIGHT: 60 IN | HEART RATE: 77 BPM | SYSTOLIC BLOOD PRESSURE: 95 MMHG | TEMPERATURE: 97.6 F | RESPIRATION RATE: 17 BRPM | BODY MASS INDEX: 30.64 KG/M2 | DIASTOLIC BLOOD PRESSURE: 62 MMHG

## 2025-07-27 DIAGNOSIS — K59.00 CONSTIPATION, UNSPECIFIED CONSTIPATION TYPE: Primary | ICD-10-CM

## 2025-07-27 LAB
ALBUMIN SERPL-MCNC: 3.9 G/DL (ref 3.5–5.2)
ALBUMIN/GLOB SERPL: 1.7 G/DL
ALP SERPL-CCNC: 67 U/L (ref 39–117)
ALT SERPL W P-5'-P-CCNC: 26 U/L (ref 1–33)
ANION GAP SERPL CALCULATED.3IONS-SCNC: 8.2 MMOL/L (ref 5–15)
AST SERPL-CCNC: 25 U/L (ref 1–32)
BASOPHILS # BLD AUTO: 0.04 10*3/MM3 (ref 0–0.2)
BASOPHILS NFR BLD AUTO: 0.6 % (ref 0–1.5)
BILIRUB SERPL-MCNC: 0.2 MG/DL (ref 0–1.2)
BILIRUB UR QL STRIP: NEGATIVE
BUN SERPL-MCNC: 16.4 MG/DL (ref 6–20)
BUN/CREAT SERPL: 20.8 (ref 7–25)
CALCIUM SPEC-SCNC: 8.8 MG/DL (ref 8.6–10.5)
CHLORIDE SERPL-SCNC: 104 MMOL/L (ref 98–107)
CLARITY UR: ABNORMAL
CO2 SERPL-SCNC: 27.8 MMOL/L (ref 22–29)
COLOR UR: YELLOW
CREAT SERPL-MCNC: 0.79 MG/DL (ref 0.57–1)
DEPRECATED RDW RBC AUTO: 46.2 FL (ref 37–54)
EGFRCR SERPLBLD CKD-EPI 2021: 96.5 ML/MIN/1.73
EOSINOPHIL # BLD AUTO: 0.11 10*3/MM3 (ref 0–0.4)
EOSINOPHIL NFR BLD AUTO: 1.6 % (ref 0.3–6.2)
ERYTHROCYTE [DISTWIDTH] IN BLOOD BY AUTOMATED COUNT: 13.2 % (ref 12.3–15.4)
GLOBULIN UR ELPH-MCNC: 2.3 GM/DL
GLUCOSE SERPL-MCNC: 97 MG/DL (ref 65–99)
GLUCOSE UR STRIP-MCNC: NEGATIVE MG/DL
HCG INTACT+B SERPL-ACNC: <0.5 MIU/ML
HCT VFR BLD AUTO: 35 % (ref 34–46.6)
HGB BLD-MCNC: 11.3 G/DL (ref 12–15.9)
HGB UR QL STRIP.AUTO: NEGATIVE
HOLD SPECIMEN: NORMAL
HOLD SPECIMEN: NORMAL
IMM GRANULOCYTES # BLD AUTO: 0.05 10*3/MM3 (ref 0–0.05)
IMM GRANULOCYTES NFR BLD AUTO: 0.7 % (ref 0–0.5)
KETONES UR QL STRIP: ABNORMAL
LEUKOCYTE ESTERASE UR QL STRIP.AUTO: NEGATIVE
LIPASE SERPL-CCNC: 31 U/L (ref 13–60)
LYMPHOCYTES # BLD AUTO: 2.72 10*3/MM3 (ref 0.7–3.1)
LYMPHOCYTES NFR BLD AUTO: 38.9 % (ref 19.6–45.3)
MCH RBC QN AUTO: 30.8 PG (ref 26.6–33)
MCHC RBC AUTO-ENTMCNC: 32.3 G/DL (ref 31.5–35.7)
MCV RBC AUTO: 95.4 FL (ref 79–97)
MONOCYTES # BLD AUTO: 0.58 10*3/MM3 (ref 0.1–0.9)
MONOCYTES NFR BLD AUTO: 8.3 % (ref 5–12)
NEUTROPHILS NFR BLD AUTO: 3.5 10*3/MM3 (ref 1.7–7)
NEUTROPHILS NFR BLD AUTO: 49.9 % (ref 42.7–76)
NITRITE UR QL STRIP: NEGATIVE
NRBC BLD AUTO-RTO: 0 /100 WBC (ref 0–0.2)
PH UR STRIP.AUTO: 8.5 [PH] (ref 5–8)
PLATELET # BLD AUTO: 246 10*3/MM3 (ref 140–450)
PMV BLD AUTO: 8.3 FL (ref 6–12)
POTASSIUM SERPL-SCNC: 3.9 MMOL/L (ref 3.5–5.2)
PROT SERPL-MCNC: 6.2 G/DL (ref 6–8.5)
PROT UR QL STRIP: NEGATIVE
RBC # BLD AUTO: 3.67 10*6/MM3 (ref 3.77–5.28)
SODIUM SERPL-SCNC: 140 MMOL/L (ref 136–145)
SP GR UR STRIP: 1.02 (ref 1–1.03)
UROBILINOGEN UR QL STRIP: ABNORMAL
WBC NRBC COR # BLD AUTO: 7 10*3/MM3 (ref 3.4–10.8)
WHOLE BLOOD HOLD COAG: NORMAL
WHOLE BLOOD HOLD SPECIMEN: NORMAL

## 2025-07-27 PROCEDURE — 74177 CT ABD & PELVIS W/CONTRAST: CPT

## 2025-07-27 PROCEDURE — 85025 COMPLETE CBC W/AUTO DIFF WBC: CPT | Performed by: EMERGENCY MEDICINE

## 2025-07-27 PROCEDURE — 80053 COMPREHEN METABOLIC PANEL: CPT | Performed by: EMERGENCY MEDICINE

## 2025-07-27 PROCEDURE — 25510000001 IOPAMIDOL PER 1 ML: Performed by: EMERGENCY MEDICINE

## 2025-07-27 PROCEDURE — 83690 ASSAY OF LIPASE: CPT | Performed by: EMERGENCY MEDICINE

## 2025-07-27 PROCEDURE — 84702 CHORIONIC GONADOTROPIN TEST: CPT | Performed by: EMERGENCY MEDICINE

## 2025-07-27 PROCEDURE — 81003 URINALYSIS AUTO W/O SCOPE: CPT

## 2025-07-27 PROCEDURE — 99285 EMERGENCY DEPT VISIT HI MDM: CPT

## 2025-07-27 RX ORDER — IOPAMIDOL 755 MG/ML
100 INJECTION, SOLUTION INTRAVASCULAR
Status: COMPLETED | OUTPATIENT
Start: 2025-07-27 | End: 2025-07-27

## 2025-07-27 RX ORDER — SODIUM CHLORIDE 0.9 % (FLUSH) 0.9 %
10 SYRINGE (ML) INJECTION AS NEEDED
Status: DISCONTINUED | OUTPATIENT
Start: 2025-07-27 | End: 2025-07-28 | Stop reason: HOSPADM

## 2025-07-27 RX ADMIN — IOPAMIDOL 75 ML: 755 INJECTION, SOLUTION INTRAVENOUS at 21:29

## 2025-07-27 NOTE — Clinical Note
[facility] EMERGENCY ROOM  [address] [place] [address]  Phone: [phone]  Fax: [phone]    Yana Dunham was seen and treated in our emergency department on 7/27/2025.  She may return to school on 07/29/2025.          Thank you for choosing [facility].    Elian Nicolas, DO

## 2025-07-27 NOTE — ED PROVIDER NOTES
Time: 7:05 PM EDT  Date of encounter:  7/27/2025  Independent Historian/Clinical History and Information was obtained by:   Patient    History is limited by: N/A    Chief Complaint   Patient presents with    Constipation     abd pain, zero BM 10 days, was scanned here on monday         History of Present Illness:  Patient is a 39 y.o. year old female who presents to the emergency department for evaluation of constipation.  Patient has not had a bowel movement for 10 days.  She has no fever, vomiting, she has been eating and drinking well has an excellent appetite currently.  The patient presents from a sober living facility.    Patient Care Team  Primary Care Provider: Provider, No Known    Past Medical History:     No Known Allergies  Past Medical History:   Diagnosis Date    Abnormal Pap smear of cervix     current WNL     Past Surgical History:   Procedure Laterality Date    ADENOIDECTOMY      CERVICAL BIOPSY  W/ LOOP ELECTRODE EXCISION      CRYOTHERAPY      TONSILLECTOMY      WRIST SURGERY Bilateral      Family History   Problem Relation Age of Onset    Hypertension Father     Asthma Son     Other Son     Breast cancer Neg Hx     Ovarian cancer Neg Hx     Uterine cancer Neg Hx     Colon cancer Neg Hx        Home Medications:  Prior to Admission medications    Medication Sig Start Date End Date Taking? Authorizing Provider   docusate sodium 100 MG capsule Take 1 capsule by mouth 2 (Two) Times a Day. 6/5/20   Servando Rodriguez MD   esomeprazole (nexIUM) 40 MG capsule Take 1 capsule by mouth Every Morning Before Breakfast. 8/23/22   Elian Nicolas, DO   famotidine (PEPCID) 20 MG tablet Take 1 tablet by mouth 2 (Two) Times a Day. 8/23/22   Elian Nicolas,    ibuprofen (ADVIL,MOTRIN) 600 MG tablet Take 1 tablet by mouth Every 8 (Eight) Hours As Needed for Mild Pain . 6/5/20   Servando Rodriguez MD   oxyCODONE-acetaminophen (PERCOCET) 5-325 MG per tablet Take 1 tablet by mouth Every 6 (Six) Hours As Needed for  "Moderate Pain . 6/5/20   Servando Rodriguez MD   polyethylene glycol (MIRALAX) 17 GM/SCOOP powder Take 17 g by mouth Daily. 7/22/25   Isis Roberts APRN   Prenatal Vit-Fe Fumarate-FA (PRENATAL, CLASSIC, VITAMIN) 28-0.8 MG tablet tablet Take  by mouth daily.    Provider, MD Janet        Social History:   Social History     Tobacco Use    Smoking status: Light Smoker     Current packs/day: 0.25     Types: Cigarettes    Smokeless tobacco: Never   Substance Use Topics    Alcohol use: No    Drug use: No     Comment: test positive for amphetimines         Review of Systems:  Review of Systems   Constitutional:  Negative for chills and fever.   HENT:  Negative for congestion, ear pain and sore throat.    Eyes:  Negative for pain.   Respiratory:  Negative for cough, chest tightness and shortness of breath.    Cardiovascular:  Negative for chest pain.   Gastrointestinal:  Positive for abdominal pain and constipation. Negative for diarrhea, nausea and vomiting.   Genitourinary:  Negative for flank pain and hematuria.   Musculoskeletal:  Negative for joint swelling.   Skin:  Negative for pallor.   Neurological:  Negative for seizures and headaches.   All other systems reviewed and are negative.       Physical Exam:  BP 95/62   Pulse 77   Temp 97.6 °F (36.4 °C) (Oral)   Resp 17   Ht 152.4 cm (60\")   Wt 70.8 kg (156 lb 1.4 oz)   LMP 07/03/2025 (Approximate)   SpO2 97%   BMI 30.48 kg/m²         Physical Exam  Vitals and nursing note reviewed.   Constitutional:       General: She is not in acute distress.     Appearance: Normal appearance. She is not toxic-appearing.   HENT:      Head: Normocephalic and atraumatic.      Mouth/Throat:      Mouth: Mucous membranes are moist.   Eyes:      General: No scleral icterus.     Pupils: Pupils are equal, round, and reactive to light.   Cardiovascular:      Rate and Rhythm: Normal rate and regular rhythm.      Pulses: Normal pulses.      Heart sounds: Normal heart sounds. "   Pulmonary:      Effort: Pulmonary effort is normal. No respiratory distress.      Breath sounds: Normal breath sounds.   Abdominal:      General: Abdomen is flat. There is no distension.      Palpations: Abdomen is soft.      Tenderness: There is abdominal tenderness. There is no guarding or rebound.   Musculoskeletal:         General: Normal range of motion.      Cervical back: Normal range of motion and neck supple.   Skin:     General: Skin is warm and dry.      Capillary Refill: Capillary refill takes less than 2 seconds.   Neurological:      General: No focal deficit present.      Mental Status: She is alert and oriented to person, place, and time. Mental status is at baseline.   Psychiatric:         Mood and Affect: Mood normal.         Behavior: Behavior normal.                            Medical Decision Making:      Comorbidities that affect care:    None    External Notes reviewed:    Previous Clinic Note: Office visit for wrist pain      The following orders were placed and all results were independently analyzed by me:  Orders Placed This Encounter   Procedures    CT Abdomen Pelvis With Contrast    [place] Draw    Comprehensive Metabolic Panel    Lipase    Urinalysis With Microscopic If Indicated (No Culture) - Urine, Clean Catch    hCG, Quantitative, Pregnancy    CBC Auto Differential    Undress & Gown    CBC & Differential    Green Top (Gel)    Lavender Top    Gold Top - SST    Light Blue Top       Medications Given in the Emergency Department:  Medications   iopamidol (ISOVUE-370) 76 % injection 100 mL (75 mL Intravenous Given 7/27/25 2129)        ED Course:    The patient was initially evaluated in the triage area where orders were placed. The patient was later dispositioned by Elian Nicolas DO.      The patient was advised to stay for completion of workup which includes but is not limited to communication of labs and radiological results, reassessment and plan. The patient was advised that  leaving prior to disposition by a provider could result in critical findings that are not communicated to the patient.     ED Course as of 07/28/25 0541   Sun Jul 27, 2025   1905 Provider In Triage: Patient was evaluated by me in triage, Freeman Stevenson PA-C. Orders were placed and the patient is currently awaiting final results and disposition.   [SK]      ED Course User Index  [SK] Freeman Stevenson PA-C       Labs:    Lab Results (last 24 hours)       Procedure Component Value Units Date/Time    Urinalysis With Microscopic If Indicated (No Culture) - Urine, Clean Catch [[id number]]  (Abnormal) Collected: 07/27/25 1928    Specimen: Urine, Clean Catch Updated: 07/27/25 1943     Color, UA Yellow     Appearance, UA Cloudy     pH, UA 8.5     Specific Gravity, UA 1.022     Glucose, UA Negative     Ketones, UA Trace     Bilirubin, UA Negative     Blood, UA Negative     Protein, UA Negative     Leuk Esterase, UA Negative     Nitrite, UA Negative     Urobilinogen, UA 1.0 E.U./dL    Narrative:      Urine microscopic not indicated.    CBC & Differential [[id number]]  (Abnormal) Collected: 07/27/25 2058    Specimen: Blood Updated: 07/27/25 2107    Narrative:      The following orders were created for panel order CBC & Differential.  Procedure                               Abnormality         Status                     ---------                               -----------         ------                     CBC Auto Differential[[id number]]        Abnormal            Final result                 Please view results for these tests on the individual orders.    Comprehensive Metabolic Panel [[id number]] Collected: 07/27/25 2058    Specimen: Blood Updated: 07/27/25 2136     Glucose 97 mg/dL      BUN 16.4 mg/dL      Creatinine 0.79 mg/dL      Sodium 140 mmol/L      Potassium 3.9 mmol/L      Chloride 104 mmol/L      CO2 27.8 mmol/L      Calcium 8.8 mg/dL      Total Protein 6.2 g/dL      Albumin 3.9 g/dL      ALT (SGPT) 26 U/L      AST  (SGOT) 25 U/L      Alkaline Phosphatase 67 U/L      Total Bilirubin 0.2 mg/dL      Globulin 2.3 gm/dL      A/G Ratio 1.7 g/dL      BUN/Creatinine Ratio 20.8     Anion Gap 8.2 mmol/L      eGFR 96.5 mL/min/1.73     Narrative:      GFR Categories in Chronic Kidney Disease (CKD)              GFR Category          GFR (mL/min/1.73)    Interpretation  G1                    90 or greater        Normal or high (1)  G2                    60-89                Mild decrease (1)  G3a                   45-59                Mild to moderate decrease  G3b                   30-44                Moderate to severe decrease  G4                    15-29                Severe decrease  G5                    14 or less           Kidney failure    (1)In the absence of evidence of kidney disease, neither GFR category G1 or G2 fulfill the criteria for CKD.    eGFR calculation 2021 CKD-EPI creatinine equation, which does not include race as a factor    Lipase [[id number]]  (Normal) Collected: 07/27/25 2058    Specimen: Blood Updated: 07/27/25 2124     Lipase 31 U/L     hCG, Quantitative, Pregnancy [[id number]] Collected: 07/27/25 2058    Specimen: Blood Updated: 07/27/25 2125     HCG Quantitative <0.50 mIU/mL     Narrative:      HCG Ranges by Gestational Age    Females - non-pregnant premenopausal   </= 1mIU/mL HCG  Females - postmenopausal               </= 7mIU/mL HCG    3 Weeks       5.4   -      72 mIU/mL  4 Weeks      10.2   -     708 mIU/mL  5 Weeks       217   -   8,245 mIU/mL  6 Weeks       152   -  32,177 mIU/mL  7 Weeks     4,059   - 153,767 mIU/mL  8 Weeks    31,366   - 149,094 mIU/mL  9 Weeks    59,109   - 135,901 mIU/mL  10 Weeks   44,186   - 170,409 mIU/mL  12 Weeks   27,107   - 201,615 mIU/mL  14 Weeks   24,302   -  93,646 mIU/mL  15 Weeks   12,540   -  69,747 mIU/mL  16 Weeks    8,904   -  55,332 mIU/mL  17 Weeks    8,240   -  51,793 mIU/mL  18 Weeks    9,649   -  55,271 mIU/mL      CBC Auto Differential [[id number]]   (Abnormal) Collected: 07/27/25 2058    Specimen: Blood Updated: 07/27/25 2107     WBC 7.00 10*3/mm3      RBC 3.67 10*6/mm3      Hemoglobin 11.3 g/dL      Hematocrit 35.0 %      MCV 95.4 fL      MCH 30.8 pg      MCHC 32.3 g/dL      RDW 13.2 %      RDW-SD 46.2 fl      MPV 8.3 fL      Platelets 246 10*3/mm3      Neutrophil % 49.9 %      Lymphocyte % 38.9 %      Monocyte % 8.3 %      Eosinophil % 1.6 %      Basophil % 0.6 %      Immature Grans % 0.7 %      Neutrophils, Absolute 3.50 10*3/mm3      Lymphocytes, Absolute 2.72 10*3/mm3      Monocytes, Absolute 0.58 10*3/mm3      Eosinophils, Absolute 0.11 10*3/mm3      Basophils, Absolute 0.04 10*3/mm3      Immature Grans, Absolute 0.05 10*3/mm3      nRBC 0.0 /100 WBC              Imaging:    CT Abdomen Pelvis With Contrast  Result Date: 7/27/2025  CT ABDOMEN PELVIS W CONTRAST Date of exam: 7/27/2025 9:27 PM EDT. Comparisons: 7/22/2025; 8/23/2022. INDICATIONS: 39-year-old female w/ h/o no bowel movement for 10 days. TECHNIQUE: Axial CT images were obtained of the abdomen and pelvis after the uneventful intravenous administration of 75 mL Isovue-370 nonionic iodinated contrast agent (or less). Reconstructed 2D coronal and sagittal images were also obtained. Automated exposure control and iterative construction methods were used. Additionally, the radiation dose reduction device was turned on for each scan per the ALARA (As Low as Reasonably Achievable) protocol. No oral contrast agent was administered for the study. The exact  intravenous contrast dose and the radiation dose are documented in the electronic medical record, EMR (Epic).  FINDINGS: Interval increase in the stool burden is noted since 7/22/2025. No mechanical bowel obstruction is suggested. No pneumoperitoneum or pneumatosis. No pericolonic fluid collection is appreciated to suggest abscess. Grossly, the mesenteric arteries are patent. The previously seen right adnexal cyst is not clearly identified. A trace  amount of pericardial effusion is seen. No other significant acute findings are appreciated. Please see prior exam reports for further detail regarding additional incidental/chronic findings.     Interval increase in stool burden is noted. Please see above comments for further detail.    Portions of this note were completed with a voice recognition program. Electronically Signed: Servando Arreguin MD  7/27/2025 10:35 PM EDT  Workstation ID: [id number]        Differential Diagnosis and Discussion:      Abdominal Pain: Based on the patient's signs and symptoms, I considered abdominal aortic aneurysm, small bowel obstruction, pancreatitis, acute cholecystitis, acute appendecitis, peptic ulcer disease, gastritis, colitis, endocrine disorders, irritable bowel syndrome and other differential diagnosis an etiology of the patient's abdominal pain.    PROCEDURES:    Labs were collected in the emergency department and all labs were reviewed and interpreted by me.  CT scan was performed in the emergency department and the CT scan radiology impression was interpreted by me.    No orders to display        Procedures    MDM                   Patient Care Considerations:    SEPSIS was considered but is NOT present in the emergency department as SIRS criteria is not present.      Consultants/Shared Management Plan:    None    Social Determinants of Health:    Patient is independent, reliable, and has access to care.       Disposition and Care Coordination:    Discharged: I considered escalation of care by admitting this patient to the hospital, however patient has no signs of obstruction or perforation    I have explained discharge medications and the need for follow up with the patient/caretakers. This was also printed in the discharge instructions. Patient was discharged with the following medications and follow up:      Medication List        New Prescriptions      polyethylene glycol 236 g solution  Commonly known as: GoLYTELY  Take  240 mL every 8 hours as needed for bowel movement.               Where to Get Your Medications        These medications were sent to [facility]/pharmacy #[id number] - [place], [place] - [address] - [phone]  - [phone] FX  [address] [place] [address]      Hours: 24-hours Phone: [phone]   polyethylene glycol 236 g solution      Your doctor    In 1 day         Final diagnoses:   Constipation, unspecified constipation type        ED Disposition       ED Disposition   Discharge    Condition   Stable    Comment   --               This medical record created using voice recognition software.             Elian Nicolas, DO  07/28/25 0586

## 2025-07-28 NOTE — DISCHARGE INSTRUCTIONS
Take GoLytely as directed.  After you have a bowel movement start taking MiraLAX daily.  Return for worsening symptoms.  Follow-up with your doctor in 1 to 2 days if no better.

## 2025-07-28 NOTE — TELEPHONE ENCOUNTER
Attempted to contact patient  for the third time regarding a referral we received from Isis Roberts. Deferring for two days.

## 2025-07-30 ENCOUNTER — APPOINTMENT (OUTPATIENT)
Facility: HOSPITAL | Age: 40
End: 2025-07-30
Payer: COMMERCIAL

## 2025-07-30 ENCOUNTER — HOSPITAL ENCOUNTER (EMERGENCY)
Facility: HOSPITAL | Age: 40
Discharge: HOME | End: 2025-07-30
Attending: EMERGENCY MEDICINE | Admitting: EMERGENCY MEDICINE
Payer: COMMERCIAL

## 2025-07-30 VITALS
BODY MASS INDEX: 30.3 KG/M2 | WEIGHT: 154.32 LBS | TEMPERATURE: 99.1 F | OXYGEN SATURATION: 99 % | RESPIRATION RATE: 20 BRPM | DIASTOLIC BLOOD PRESSURE: 73 MMHG | SYSTOLIC BLOOD PRESSURE: 131 MMHG | HEART RATE: 92 BPM | HEIGHT: 60 IN

## 2025-07-30 DIAGNOSIS — I80.02 THROMBOPHLEBITIS OF SUPERFICIAL VEINS OF LEFT LOWER EXTREMITY: Primary | ICD-10-CM

## 2025-07-30 PROCEDURE — 99284 EMERGENCY DEPT VISIT MOD MDM: CPT

## 2025-07-30 PROCEDURE — 93971 EXTREMITY STUDY: CPT | Performed by: SURGERY

## 2025-07-30 PROCEDURE — 63710000001 PREDNISONE PER 1 MG: Performed by: NURSE PRACTITIONER

## 2025-07-30 PROCEDURE — 93971 EXTREMITY STUDY: CPT

## 2025-07-30 PROCEDURE — 63710000001 DIPHENHYDRAMINE PER 50 MG: Performed by: NURSE PRACTITIONER

## 2025-07-30 RX ORDER — ACETAMINOPHEN 325 MG/1
650 TABLET ORAL ONCE
Status: COMPLETED | OUTPATIENT
Start: 2025-07-30 | End: 2025-07-30

## 2025-07-30 RX ORDER — DIPHENHYDRAMINE HCL 25 MG
25 CAPSULE ORAL ONCE
Status: COMPLETED | OUTPATIENT
Start: 2025-07-30 | End: 2025-07-30

## 2025-07-30 RX ORDER — PREDNISONE 20 MG/1
60 TABLET ORAL ONCE
Status: COMPLETED | OUTPATIENT
Start: 2025-07-30 | End: 2025-07-30

## 2025-07-30 RX ADMIN — ACETAMINOPHEN 650 MG: 325 TABLET ORAL at 20:43

## 2025-07-30 RX ADMIN — DIPHENHYDRAMINE HYDROCHLORIDE 25 MG: 25 CAPSULE ORAL at 20:43

## 2025-07-30 RX ADMIN — PREDNISONE 60 MG: 20 TABLET ORAL at 20:43

## 2025-07-30 NOTE — ED PROVIDER NOTES
Time: 7:53 PM EDT  Date of encounter:  7/30/2025  Independent Historian/Clinical History and Information was obtained by:   Patient    History is limited by: N/A    Chief Complaint: Knot on left leg      History of Present Illness:  Patient is a 39 y.o. year old female who presents to the emergency department for evaluation of knot and swelling left leg that is worsening.  Patient changed into shorts and went outside to play registracija vozila and then she noticed she had an area that was itchy and slightly tender to her left lower leg in the front.  When she rubbed on it she felt an area felt swollen and she feels like it progressively has worsened over the last hour.  Patient denies any trauma.  She does not think anything bit her while she was outside but is unsure.  No numbness tingling or weakness      Patient Care Team  Primary Care Provider: Provider, No Known    Past Medical History:     No Known Allergies  Past Medical History:   Diagnosis Date    Abnormal Pap smear of cervix     current WNL     Past Surgical History:   Procedure Laterality Date    ADENOIDECTOMY      CERVICAL BIOPSY  W/ LOOP ELECTRODE EXCISION      CRYOTHERAPY      TONSILLECTOMY      WRIST SURGERY Bilateral      Family History   Problem Relation Age of Onset    Hypertension Father     Asthma Son     Other Son     Breast cancer Neg Hx     Ovarian cancer Neg Hx     Uterine cancer Neg Hx     Colon cancer Neg Hx        Home Medications:  Prior to Admission medications    Medication Sig Start Date End Date Taking? Authorizing Provider   docusate sodium 100 MG capsule Take 1 capsule by mouth 2 (Two) Times a Day. 6/5/20   Servando Rodriguez MD   esomeprazole (nexIUM) 40 MG capsule Take 1 capsule by mouth Every Morning Before Breakfast. 8/23/22   Elian Nicolas, DO   famotidine (PEPCID) 20 MG tablet Take 1 tablet by mouth 2 (Two) Times a Day. 8/23/22   Elian Nicolas, DO   ibuprofen (ADVIL,MOTRIN) 600 MG tablet Take 1 tablet by mouth Every 8 (Eight) Hours  "As Needed for Mild Pain . 6/5/20   Servando Rodriguez MD   oxyCODONE-acetaminophen (PERCOCET) 5-325 MG per tablet Take 1 tablet by mouth Every 6 (Six) Hours As Needed for Moderate Pain . 6/5/20   Servando Rodriguez MD   polyethylene glycol (GoLYTELY) 236 g solution Take 240 mL every 8 hours as needed for bowel movement. 7/27/25   Elian Nicolas,    polyethylene glycol (MIRALAX) 17 GM/SCOOP powder Take 17 g by mouth Daily. 7/22/25   Isis Roberts APRN   Prenatal Vit-Fe Fumarate-FA (PRENATAL, CLASSIC, VITAMIN) 28-0.8 MG tablet tablet Take  by mouth daily.    Provider, MD Janet        Social History:   Social History     Tobacco Use    Smoking status: Light Smoker     Current packs/day: 0.25     Types: Cigarettes    Smokeless tobacco: Never   Substance Use Topics    Alcohol use: No    Drug use: No     Comment: test positive for amphetimines         Review of Systems:  Review of Systems   Constitutional:  Negative for chills and fever.   Respiratory:  Negative for shortness of breath.    Cardiovascular:  Positive for leg swelling. Negative for chest pain and palpitations.   Musculoskeletal:  Negative for gait problem.   Skin:  Positive for color change (Slight bruising at site but patient has varicose veins). Negative for wound.   Neurological:  Negative for weakness and numbness.   Psychiatric/Behavioral: Negative.     All other systems reviewed and are negative.       Physical Exam:  /73 (BP Location: Right arm, Patient Position: Sitting)   Pulse 92   Temp 99.1 °F (37.3 °C) (Oral)   Resp 20   Ht 152.4 cm (60\")   Wt 70 kg (154 lb 5.2 oz)   LMP 07/03/2025 (Approximate)   SpO2 99%   BMI 30.14 kg/m²     Physical Exam  Vitals and nursing note reviewed.   HENT:      Head: Atraumatic.   Cardiovascular:      Rate and Rhythm: Normal rate and regular rhythm.      Pulses: Normal pulses.      Heart sounds: Normal heart sounds.   Pulmonary:      Effort: Pulmonary effort is normal.      Breath sounds: Normal " breath sounds.   Musculoskeletal:         General: Tenderness (Tenderness to 2 left proximal lateral lower leg with some mild bruising and varicosity.  Area is focal and is about quarter sized) present. Normal range of motion.      Cervical back: Normal range of motion.   Skin:     General: Skin is warm and dry.      Findings: Bruising (Patient has several areas of bruising on both legs where there are micro areas of clustered varicosities) present.   Neurological:      General: No focal deficit present.      Mental Status: She is alert.   Psychiatric:         Mood and Affect: Mood normal.         Behavior: Behavior normal.                Medical Decision Making:      Comorbidities that affect care:    Cervical dysplasia, smoking, substance abuse    External Notes reviewed:    Previous ED Note: Patient last seen on 27 July for constipation      The following orders were placed and all results were independently analyzed by me:  Orders Placed This Encounter   Procedures    Apply ace wrap       Medications Given in the Emergency Department:  Medications   diphenhydrAMINE (BENADRYL) capsule 25 mg (25 mg Oral Given 7/30/25 2043)   predniSONE (DELTASONE) tablet 60 mg (60 mg Oral Given 7/30/25 2043)   acetaminophen (TYLENOL) tablet 650 mg (650 mg Oral Given 7/30/25 2043)        ED Course:    ED Course as of 07/30/25 2046 Wed Jul 30, 2025 2035 Duplex Venous Lower Extremity - Left CAR  SVT at site of concern.  No DVT [DS]      ED Course User Index  [DS] Isis Roberts APRN       Labs:    Lab Results (last 24 hours)       ** No results found for the last 24 hours. **             Imaging:    No Radiology Exams Resulted Within Past 24 Hours      Differential Diagnosis and Discussion:    Extremity Pain: Differential diagnosis includes but is not limited to soft tissue sprain, tendonitis, tendon injury, dislocation, fracture, deep vein thrombosis, arterial insufficiency, osteoarthritis, bursitis, and ligamentous  damage.    PROCEDURES:    Ultrasound was performed in the emergency department and the ultrasound impression was interpreted by me.     No orders to display       Procedures    MDM  Number of Diagnoses or Management Options  Thrombophlebitis of superficial veins of left lower extremity  Diagnosis management comments: I have explained the patient´s condition, diagnoses and treatment plan based on the information available to me at this time. I have answered questions and addressed any concerns. The patient has a good  understanding of the patient´s diagnosis, condition, and treatment plan as can be expected at this point. The vital signs have been stable. The patient´s condition is stable and appropriate for discharge from the emergency department.      The patient will pursue further outpatient evaluation with the primary care physician or other designated or consulting physician as outlined in the discharge instructions. They are agreeable to this plan of care and follow-up instructions have been explained in detail. The patient has received these instructions in written format and have expressed an understanding of the discharge instructions. The patient is aware that any significant change in condition or worsening of symptoms should prompt an immediate return to this or the closest emergency department or call to 911.       Amount and/or Complexity of Data Reviewed  Tests in the radiology section of CPT®: ordered and reviewed  Tests in the medicine section of CPT®: ordered and reviewed    Risk of Complications, Morbidity, and/or Mortality  Presenting problems: low  Diagnostic procedures: low  Management options: low    Patient Progress  Patient progress: stable           Patient Care Considerations:    LABS: I considered ordering labs, however no signs of cellulitis or infection patient does not need to be started on blood thinner due to superficial phlebitis      Consultants/Shared Management Plan:    SHARED  VISIT: I have discussed the case with my supervising physician, Dr. Nicolas who states okay to discharge home with symptomatic relief. The substantive portion of the medical decision was made by the attesting physician who made or approve the management plan and will take responsibility for the patient.  Clinical findings were discussed and ultimate disposition was made in consult with supervising physician.    Social Determinants of Health:    Patient is independent, reliable, and has access to care.       Disposition and Care Coordination:    Discharged: I considered escalation of care by admitting this patient to the hospital, however patient has no neurologic deficits in the left leg and has findings of superficial venous thrombi-itis which can be treated at home    I have explained the patient´s condition, diagnoses and treatment plan based on the information available to me at this time. I have answered questions and addressed any concerns. The patient has a good  understanding of the patient´s diagnosis, condition, and treatment plan as can be expected at this point. The vital signs have been stable. The patient´s condition is stable and appropriate for discharge from the emergency department.      The patient will pursue further outpatient evaluation with the primary care physician or other designated or consulting physician as outlined in the discharge instructions. They are agreeable to this plan of care and follow-up instructions have been explained in detail. The patient has received these instructions in written format and has expressed an understanding of the discharge instructions. The patient is aware that any significant change in condition or worsening of symptoms should prompt an immediate return to this or the closest emergency department or call to 911.  I have explained discharge medications and the need for follow up with the patient/caretakers. This was also printed in the discharge  instructions. Patient was discharged with the following medications and follow up:      Medication List        New Prescriptions      diclofenac 50 MG EC tablet  Commonly known as: VOLTAREN  Take 1 tablet by mouth 3 (Three) Times a Day As Needed (pain).               Where to Get Your Medications        These medications were sent to [facility] - [place], [place] - [address] [place] [address] [address] - [phone]  - [phone] [address], [facility] [address]      Phone: [phone]   diclofenac 50 MG EC tablet      Provider, No Known  [place] [id number] [phone]    Schedule an appointment as soon as possible for a visit          Final diagnoses:   Thrombophlebitis of superficial veins of left lower extremity        ED Disposition       ED Disposition   Discharge    Condition   Stable    Comment   --               This medical record created using voice recognition software.             Isis Roberts, NAGA  07/30/25 [id number]

## 2025-07-31 LAB
BH CV LOW VAS LEFT VARICOSITY BK VESSEL: 1
BH CV LOWER VASCULAR LEFT COMMON FEMORAL AUGMENT: NORMAL
BH CV LOWER VASCULAR LEFT COMMON FEMORAL COMPETENT: NORMAL
BH CV LOWER VASCULAR LEFT COMMON FEMORAL COMPRESS: NORMAL
BH CV LOWER VASCULAR LEFT COMMON FEMORAL PHASIC: NORMAL
BH CV LOWER VASCULAR LEFT COMMON FEMORAL SPONT: NORMAL
BH CV LOWER VASCULAR LEFT DISTAL FEMORAL COMPRESS: NORMAL
BH CV LOWER VASCULAR LEFT GASTRONEMIUS COMPRESS: NORMAL
BH CV LOWER VASCULAR LEFT GREATER SAPH AK COMPRESS: NORMAL
BH CV LOWER VASCULAR LEFT GREATER SAPH BK COMPRESS: NORMAL
BH CV LOWER VASCULAR LEFT LESSER SAPH COMPRESS: NORMAL
BH CV LOWER VASCULAR LEFT MID FEMORAL AUGMENT: NORMAL
BH CV LOWER VASCULAR LEFT MID FEMORAL COMPETENT: NORMAL
BH CV LOWER VASCULAR LEFT MID FEMORAL COMPRESS: NORMAL
BH CV LOWER VASCULAR LEFT MID FEMORAL PHASIC: NORMAL
BH CV LOWER VASCULAR LEFT MID FEMORAL SPONT: NORMAL
BH CV LOWER VASCULAR LEFT PERONEAL COMPRESS: NORMAL
BH CV LOWER VASCULAR LEFT POPLITEAL AUGMENT: NORMAL
BH CV LOWER VASCULAR LEFT POPLITEAL COMPETENT: NORMAL
BH CV LOWER VASCULAR LEFT POPLITEAL COMPRESS: NORMAL
BH CV LOWER VASCULAR LEFT POPLITEAL PHASIC: NORMAL
BH CV LOWER VASCULAR LEFT POPLITEAL SPONT: NORMAL
BH CV LOWER VASCULAR LEFT POSTERIOR TIBIAL COMPRESS: NORMAL
BH CV LOWER VASCULAR LEFT PROXIMAL FEMORAL COMPRESS: NORMAL
BH CV LOWER VASCULAR LEFT SAPHENOFEMORAL JUNCTION COMPRESS: NORMAL
BH CV LOWER VASCULAR LEFT VARICOSITY BK COMPRESS: NORMAL
BH CV LOWER VASCULAR LEFT VARICOSITY BK THROMBUS: NORMAL
BH CV LOWER VASCULAR RIGHT COMMON FEMORAL AUGMENT: NORMAL
BH CV LOWER VASCULAR RIGHT COMMON FEMORAL COMPETENT: NORMAL
BH CV LOWER VASCULAR RIGHT COMMON FEMORAL COMPRESS: NORMAL
BH CV LOWER VASCULAR RIGHT COMMON FEMORAL PHASIC: NORMAL
BH CV LOWER VASCULAR RIGHT COMMON FEMORAL SPONT: NORMAL
BH CV VAS PRELIMINARY FINDINGS SCRIPTING: 1

## 2025-07-31 NOTE — DISCHARGE INSTRUCTIONS
You have no deep vein thrombosis which is the actual blood clots that you have to be on blood thinner for.    You do have a superficial venous thrombosis which is likely due to where your varicose veins are.  This is treated with warm compresses and anti-inflammatories.  Keep leg elevated and may wear Ace wrap for comfort

## 2025-07-31 NOTE — ED PROVIDER NOTES
"SHARED VISIT ATTESTATION:    This visit was performed by myself and an APC.  I personally approved the management plan/medical decision making and take responsibility for the patient management.      SHARED VISIT NOTE:    Patient is 39 y.o. year old female that presents to the ED for evaluation of left leg swelling and pain.         ED Course:    /73 (BP Location: Right arm, Patient Position: Sitting)   Pulse 92   Temp 99.1 °F (37.3 °C) (Oral)   Resp 20   Ht 152.4 cm (60\")   Wt 70 kg (154 lb 5.2 oz)   LMP 07/03/2025 (Approximate)   SpO2 99%   BMI 30.14 kg/m²       The following orders were placed and all results were independently analyzed by me:  Orders Placed This Encounter   Procedures    Apply ace wrap       Medications Given in the Emergency Department:  Medications   diphenhydrAMINE (BENADRYL) capsule 25 mg (25 mg Oral Given 7/30/25 2043)   predniSONE (DELTASONE) tablet 60 mg (60 mg Oral Given 7/30/25 2043)   acetaminophen (TYLENOL) tablet 650 mg (650 mg Oral Given 7/30/25 2043)        ED Course:    ED Course as of 07/31/25 0300   Wed Jul 30, 2025 2035 Duplex Venous Lower Extremity - Left CAR  SVT at site of concern.  No DVT [DS]      ED Course User Index  [DS] Isis Roberts APRN       Labs:    Lab Results (last 24 hours)       ** No results found for the last 24 hours. **             Imaging:    No Radiology Exams Resulted Within Past 24 Hours    MDM:    Procedures    Ultrasound was performed in the emergency department and the ultrasound impression was interpreted by me.                      Elian Nicolas DO  03:00 EDT  07/31/25         Elian Nicolas DO  07/31/25 0300    "

## 2025-08-07 ENCOUNTER — TELEPHONE (OUTPATIENT)
Dept: OBSTETRICS AND GYNECOLOGY | Age: 40
End: 2025-08-07
Payer: COMMERCIAL

## 2025-08-08 ENCOUNTER — OFFICE VISIT (OUTPATIENT)
Dept: OBSTETRICS AND GYNECOLOGY | Age: 40
End: 2025-08-08
Payer: COMMERCIAL

## 2025-08-08 VITALS
HEART RATE: 79 BPM | BODY MASS INDEX: 30.43 KG/M2 | DIASTOLIC BLOOD PRESSURE: 61 MMHG | SYSTOLIC BLOOD PRESSURE: 92 MMHG | HEIGHT: 60 IN | WEIGHT: 155 LBS

## 2025-08-08 DIAGNOSIS — N83.201 CYST OF RIGHT OVARY: Primary | ICD-10-CM

## 2025-08-08 DIAGNOSIS — Z01.419 WELL WOMAN EXAM: ICD-10-CM

## 2025-08-08 DIAGNOSIS — Z11.3 ROUTINE SCREENING FOR STI (SEXUALLY TRANSMITTED INFECTION): ICD-10-CM

## 2025-08-08 DIAGNOSIS — Z01.419 ENCOUNTER FOR GYNECOLOGICAL EXAMINATION WITHOUT ABNORMAL FINDING: ICD-10-CM

## 2025-08-08 DIAGNOSIS — F17.200 TOBACCO USE DISORDER: ICD-10-CM

## 2025-08-08 LAB
C TRACH DNA SPEC QL NAA+PROBE: NOT DETECTED
CHOLEST SERPL-MCNC: 181 MG/DL (ref 0–200)
HBA1C MFR BLD: 5.1 % (ref 4.8–5.6)
HDLC SERPL-MCNC: 68 MG/DL (ref 40–60)
HIV 1+2 AB+HIV1 P24 AG SERPL QL IA: NORMAL
LDLC SERPL CALC-MCNC: 102 MG/DL (ref 0–100)
LDLC/HDLC SERPL: 1.49 {RATIO}
N GONORRHOEA RRNA SPEC QL NAA+PROBE: NOT DETECTED
TRIGL SERPL-MCNC: 59 MG/DL (ref 0–150)
VLDLC SERPL-MCNC: 11 MG/DL (ref 5–40)

## 2025-08-08 PROCEDURE — G0123 SCREEN CERV/VAG THIN LAYER: HCPCS

## 2025-08-08 PROCEDURE — 87591 N.GONORRHOEAE DNA AMP PROB: CPT

## 2025-08-08 PROCEDURE — 83036 HEMOGLOBIN GLYCOSYLATED A1C: CPT

## 2025-08-08 PROCEDURE — 80061 LIPID PANEL: CPT

## 2025-08-08 PROCEDURE — 87624 HPV HI-RISK TYP POOLED RSLT: CPT

## 2025-08-08 PROCEDURE — 86592 SYPHILIS TEST NON-TREP QUAL: CPT

## 2025-08-08 PROCEDURE — 87491 CHLMYD TRACH DNA AMP PROBE: CPT

## 2025-08-08 PROCEDURE — G0432 EIA HIV-1/HIV-2 SCREEN: HCPCS

## 2025-08-08 RX ORDER — TRAZODONE HYDROCHLORIDE 50 MG/1
TABLET ORAL
COMMUNITY
Start: 2025-07-22

## 2025-08-08 RX ORDER — CITALOPRAM HYDROBROMIDE 10 MG/1
TABLET ORAL
COMMUNITY
Start: 2025-07-29

## 2025-08-08 RX ORDER — ONDANSETRON 4 MG/1
TABLET, ORALLY DISINTEGRATING ORAL
COMMUNITY
Start: 2025-06-25

## 2025-08-08 RX ORDER — NALTREXONE HYDROCHLORIDE 50 MG/1
TABLET, FILM COATED ORAL
COMMUNITY
Start: 2025-08-05

## 2025-08-08 RX ORDER — OMEPRAZOLE 20 MG/1
CAPSULE, DELAYED RELEASE ORAL
COMMUNITY
Start: 2025-07-18

## 2025-08-08 RX ORDER — OXYBUTYNIN CHLORIDE 5 MG/1
TABLET ORAL
COMMUNITY
Start: 2025-07-29

## 2025-08-08 RX ORDER — HYDROXYZINE PAMOATE 50 MG/1
CAPSULE ORAL
COMMUNITY
Start: 2025-08-01

## 2025-08-08 RX ORDER — LACTULOSE 10 G/15ML
SOLUTION ORAL
COMMUNITY
Start: 2025-08-05

## 2025-08-10 ENCOUNTER — RESULTS FOLLOW-UP (OUTPATIENT)
Dept: OBSTETRICS AND GYNECOLOGY | Age: 40
End: 2025-08-10
Payer: COMMERCIAL

## 2025-08-10 LAB — RPR SER QL: NON REACTIVE

## 2025-08-12 LAB
CYTOLOGIST CVX/VAG CYTO: ABNORMAL
CYTOLOGY CVX/VAG DOC CYTO: ABNORMAL
CYTOLOGY CVX/VAG DOC THIN PREP: ABNORMAL
DX ICD CODE: ABNORMAL
HPV I/H RISK 4 DNA CVX QL PROBE+SIG AMP: POSITIVE
OTHER STN SPEC: ABNORMAL
SERVICE CMNT-IMP: ABNORMAL
STAT OF ADQ CVX/VAG CYTO-IMP: ABNORMAL